# Patient Record
Sex: MALE | Race: WHITE | Employment: FULL TIME | URBAN - METROPOLITAN AREA
[De-identification: names, ages, dates, MRNs, and addresses within clinical notes are randomized per-mention and may not be internally consistent; named-entity substitution may affect disease eponyms.]

---

## 2020-05-21 ENCOUNTER — VIRTUAL VISIT (OUTPATIENT)
Dept: FAMILY MEDICINE | Facility: OTHER | Age: 25
End: 2020-05-21

## 2020-05-22 NOTE — PROGRESS NOTES
"Date: 2020 10:54:24  Clinician: Lorna Del Real  Clinician NPI: [id number]  Patient: Carmelo Coello  Patient : 1995  Patient Address: [address] [place], [place] [id number]  Patient Phone: [phone]  Visit Protocol: URI  Patient Summary:  Carmelo is a 25 year old ( : 1995 ) male who initiated a Visit for COVID-19 (Coronavirus) evaluation and screening. When asked the question \"Please sign me up to receive news, health information and promotions from [employer].\", Carmelo responded \"No\".    Carmelo states his symptoms started 1-2 days ago.   His symptoms consist of facial pain or pressure, chills, a sore throat, a cough, nasal congestion, rhinitis, a headache, malaise, and anosmia. He is experiencing mild difficulty breathing with activities but can speak normally in full sentences. Carmelo also feels feverish but was unable to measure his temperature.   Symptom details     Nasal secretions: The color of his mucus is clear.    Cough: Carmelo coughs a few times an hour and his cough is not more bothersome at night. Phlegm comes into his throat when he coughs. He does not believe his cough is caused by post-nasal drip. The color of the phlegm is clear.     Sore throat: Carmelo reports having mild throat pain (1-3 on a 10 point pain scale), does not have exudate on his tonsils, and can swallow liquids. He is not sure if the lymph nodes in his neck are enlarged. A rash has not appeared on the skin since the sore throat started.     Facial pain or pressure: The facial pain or pressure feels worse when bending over or leaning forward.     Headache: He states the headache is mild (1-3 on a 10 point pain scale).      Carmelo denies having nausea, teeth pain, ageusia, diarrhea, wheezing, vomiting, ear pain, and myalgias. He also denies having recent facial or sinus surgery in the past 60 days and taking antibiotic medication for the symptoms.   Precipitating events  Within the past week, Carmelo has not been exposed to someone " Patient re-evaluated by doctor Elli Luong and patient being prepared for discharge.       Sesar Koch RN  09/04/19 1474 with strep throat. He has not recently been exposed to someone with influenza. Carmelo has been in close contact with the following high risk individuals: adults 65 or older.   Pertinent COVID-19 (Coronavirus) information  In the past 14 days, Carmelo has not worked in a congregate living setting.   He does not work or volunteer as healthcare worker or a  and does not work or volunteer in a healthcare facility.   Carmelo also has not lived in a congregate living setting in the past 14 days. He does not live with a healthcare worker.   Carmelo has not had a close contact with a laboratory-confirmed COVID-19 patient within 14 days of symptom onset.   Pertinent medical history  Carmelo had 1 sinus infection within the past year.   Carmelo needs a return to work/school note.   Weight: 170 lbs   Carmelo smokes or uses smokeless tobacco.   Additional information as reported by the patient (free text): I was in close contact with my significant other when she developed symptoms about two days ago. Her symptoms became much worse last night and fit almost every symptom of Covid-19, including very laboured breathing, fever, chills, cough, pain in ears, sore throat, and a short period of experiencing delusions.     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   Weight: 170 lbs    MEDICATIONS: clonazepam oral, ALLERGIES: NKDA  Clinician Response:  Dear Carmelo,    Your symptoms show that you may have coronavirus (COVID-19). This illness can cause fever, cough and trouble breathing. Many people get a mild case and get better on their own. Some people can get very sick.   Will I be tested for COVID-19?  Because we have limited testing supplies we are not testing everyone if they are low risk. We are testing if:   You are very ill. For example, you're on chemotherapy, dialysis or home hospice care. (Contact your  specialty clinic or program.)   You live in a nursing home or other long-term care facility. (Talk to your nurse manager or medical director.)   You're a health care worker. ([facility] employees Contact our employee health office for testing.)   We are performing limited curbside testing for healthcare/first responders and people with medical problems that put them at increased risk. It does not appear by the [facility] information you submitted that you meet any of these criteria. If there are medical problems that we did not know about, please repeat an [facility] visit and let us know what medical conditions you have.  While you do not qualify for testing via [facility], there are other locations that are offering testing for COVID-19. Please visit [email or web address].gov/covid19/for-[place]/if-sick/testing-locations/index.jsp&nbsp;to find a location near you if you are interested in being tested.  How can I protect others?  Without a test, we can't know for sure that you have COVID-19. For safety, it's very important to follow these rules.  First, stay home and away from others (self-isolate) until:   You've had no fever---and no medicine that reduces fever---for 3 full days (72 hours). And...    Your other symptoms have gotten better. For example, your cough or breathing has improved. And...   At least 10 days have passed since your symptoms started.   During this time:   Don't go to work, school or anywhere else.    Stay away from others in your home. No hugging, kissing or shaking hands.   Don't let anyone visit.   Cover your mouth and nose with a mask, tissue or wash cloth to avoid spreading germs.   Wash your hands and face often. Use soap and water.   How can I take care of myself?  1.Take Tylenol (acetaminophen) for fever or pain. If you have liver or kidney problems, ask your family doctor if it's okay to take Tylenol.   Adults can take either:    650 mg (two 325 mg pills) every 4 to 6 hours, or...   1,000 mg  (two 500 mg pills) every 8 hours as needed.    Note: Don't take more than 3,000 mg in one day.  For children, check the Tylenol bottle for the right dose. The dose is based on the child's age or weight.   2.If you have other health problems (like cancer, heart failure, an organ transplant or severe kidney disease): Call your specialty clinic if you don't feel better in the next 2 days.  3.Know when to call 911: If your breathing is so bad that it keeps you from doing normal activities, call 911 or go to the emergency room. Tell them that you've been staying home and may have COVID-19.  4.Sign up for [employer]. We know it's scary to hear that you might have COVID-19. We want to track your symptoms to make sure you're okay over the next 2 weeks. Please look for an email from [employer]---this is a free, online program that we'll use to keep in touch. To sign up, follow the link in the email. Learn more at [email or web address].  Where can I get more information?  To learn more about COVID-19 and how to care for yourself at home, please visit the CDC website at https://www.cdc.gov/coronavirus/2019-ncov/about/steps-when-sick.html.  For more options for care at [facility], please visit our website at [email or web address]   If you are interested in becoming part of a [facility] clinic trial related to COVID19 please go to [email or web address] for information, if you qualify.         Diagnosis: Cough  Diagnosis ICD: R05  Additional Clinician Notes: Before returning to work, you must be fever free for 3 days and have an improvement in symptoms. Contact your Employee Health Department for your company's return to work guidelines.

## 2021-04-23 ENCOUNTER — HOSPITAL ENCOUNTER (INPATIENT)
Facility: CLINIC | Age: 26
LOS: 6 days | Discharge: HOME | DRG: 896 | End: 2021-04-29
Attending: EMERGENCY MEDICINE | Admitting: STUDENT IN AN ORGANIZED HEALTH CARE EDUCATION/TRAINING PROGRAM
Payer: COMMERCIAL

## 2021-04-23 ENCOUNTER — TELEPHONE (OUTPATIENT)
Dept: BEHAVIORAL HEALTH | Facility: CLINIC | Age: 26
End: 2021-04-23

## 2021-04-23 DIAGNOSIS — F41.1 GENERALIZED ANXIETY DISORDER: ICD-10-CM

## 2021-04-23 DIAGNOSIS — F13.159: ICD-10-CM

## 2021-04-23 DIAGNOSIS — F13.950: ICD-10-CM

## 2021-04-23 DIAGNOSIS — Z11.52 ENCOUNTER FOR SCREENING LABORATORY TESTING FOR SEVERE ACUTE RESPIRATORY SYNDROME CORONAVIRUS 2 (SARS-COV-2): ICD-10-CM

## 2021-04-23 LAB
ALBUMIN SERPL-MCNC: 4.2 G/DL (ref 3.4–5)
ALBUMIN UR-MCNC: NEGATIVE MG/DL
ALP SERPL-CCNC: 59 U/L (ref 40–150)
ALT SERPL W P-5'-P-CCNC: 18 U/L (ref 0–70)
AMPHETAMINES UR QL SCN: NEGATIVE
ANION GAP SERPL CALCULATED.3IONS-SCNC: 8 MMOL/L (ref 3–14)
APPEARANCE UR: CLEAR
AST SERPL W P-5'-P-CCNC: 12 U/L (ref 0–45)
BARBITURATES UR QL: NEGATIVE
BASOPHILS # BLD AUTO: 0 10E9/L (ref 0–0.2)
BASOPHILS NFR BLD AUTO: 0.6 %
BENZODIAZ UR QL: POSITIVE
BILIRUB SERPL-MCNC: 0.5 MG/DL (ref 0.2–1.3)
BILIRUB UR QL STRIP: NEGATIVE
BUN SERPL-MCNC: 17 MG/DL (ref 7–30)
CALCIUM SERPL-MCNC: 9.2 MG/DL (ref 8.5–10.1)
CANNABINOIDS UR QL SCN: NEGATIVE
CHLORIDE SERPL-SCNC: 104 MMOL/L (ref 94–109)
CO2 SERPL-SCNC: 29 MMOL/L (ref 20–32)
COCAINE UR QL: NEGATIVE
COLOR UR AUTO: YELLOW
CREAT SERPL-MCNC: 1.13 MG/DL (ref 0.66–1.25)
DIFFERENTIAL METHOD BLD: NORMAL
EOSINOPHIL # BLD AUTO: 0 10E9/L (ref 0–0.7)
EOSINOPHIL NFR BLD AUTO: 0.3 %
ERYTHROCYTE [DISTWIDTH] IN BLOOD BY AUTOMATED COUNT: 11.6 % (ref 10–15)
ETHANOL UR QL SCN: NEGATIVE
GFR SERPL CREATININE-BSD FRML MDRD: 89 ML/MIN/{1.73_M2}
GLUCOSE SERPL-MCNC: 104 MG/DL (ref 70–99)
GLUCOSE UR STRIP-MCNC: NEGATIVE MG/DL
HCT VFR BLD AUTO: 42.8 % (ref 40–53)
HGB BLD-MCNC: 14.8 G/DL (ref 13.3–17.7)
HGB UR QL STRIP: NEGATIVE
IMM GRANULOCYTES # BLD: 0 10E9/L (ref 0–0.4)
IMM GRANULOCYTES NFR BLD: 0.3 %
INTERPRETATION ECG - MUSE: NORMAL
KETONES UR STRIP-MCNC: NEGATIVE MG/DL
LABORATORY COMMENT REPORT: NORMAL
LEUKOCYTE ESTERASE UR QL STRIP: NEGATIVE
LYMPHOCYTES # BLD AUTO: 1.8 10E9/L (ref 0.8–5.3)
LYMPHOCYTES NFR BLD AUTO: 27.1 %
MCH RBC QN AUTO: 32.2 PG (ref 26.5–33)
MCHC RBC AUTO-ENTMCNC: 34.6 G/DL (ref 31.5–36.5)
MCV RBC AUTO: 93 FL (ref 78–100)
MONOCYTES # BLD AUTO: 0.7 10E9/L (ref 0–1.3)
MONOCYTES NFR BLD AUTO: 10.7 %
NEUTROPHILS # BLD AUTO: 4 10E9/L (ref 1.6–8.3)
NEUTROPHILS NFR BLD AUTO: 61 %
NITRATE UR QL: NEGATIVE
NRBC # BLD AUTO: 0 10*3/UL
NRBC BLD AUTO-RTO: 0 /100
OPIATES UR QL SCN: NEGATIVE
PH UR STRIP: 6 PH (ref 5–7)
PLATELET # BLD AUTO: 239 10E9/L (ref 150–450)
POTASSIUM SERPL-SCNC: 3.9 MMOL/L (ref 3.4–5.3)
PROT SERPL-MCNC: 7.5 G/DL (ref 6.8–8.8)
RBC # BLD AUTO: 4.59 10E12/L (ref 4.4–5.9)
SARS-COV-2 RNA RESP QL NAA+PROBE: NEGATIVE
SODIUM SERPL-SCNC: 141 MMOL/L (ref 133–144)
SOURCE: NORMAL
SP GR UR STRIP: 1.02 (ref 1–1.03)
SPECIMEN SOURCE: NORMAL
UROBILINOGEN UR STRIP-MCNC: NORMAL MG/DL (ref 0–2)
WBC # BLD AUTO: 6.6 10E9/L (ref 4–11)

## 2021-04-23 PROCEDURE — 250N000013 HC RX MED GY IP 250 OP 250 PS 637: Performed by: EMERGENCY MEDICINE

## 2021-04-23 PROCEDURE — 120N000002 HC R&B MED SURG/OB UMMC

## 2021-04-23 PROCEDURE — 99285 EMERGENCY DEPT VISIT HI MDM: CPT | Mod: 25 | Performed by: EMERGENCY MEDICINE

## 2021-04-23 PROCEDURE — C9803 HOPD COVID-19 SPEC COLLECT: HCPCS | Performed by: EMERGENCY MEDICINE

## 2021-04-23 PROCEDURE — 81003 URINALYSIS AUTO W/O SCOPE: CPT | Performed by: EMERGENCY MEDICINE

## 2021-04-23 PROCEDURE — 93010 ELECTROCARDIOGRAM REPORT: CPT | Performed by: EMERGENCY MEDICINE

## 2021-04-23 PROCEDURE — 93005 ELECTROCARDIOGRAM TRACING: CPT | Performed by: EMERGENCY MEDICINE

## 2021-04-23 PROCEDURE — 85025 COMPLETE CBC W/AUTO DIFF WBC: CPT | Performed by: EMERGENCY MEDICINE

## 2021-04-23 PROCEDURE — 99222 1ST HOSP IP/OBS MODERATE 55: CPT | Mod: AI | Performed by: STUDENT IN AN ORGANIZED HEALTH CARE EDUCATION/TRAINING PROGRAM

## 2021-04-23 PROCEDURE — 80053 COMPREHEN METABOLIC PANEL: CPT | Performed by: EMERGENCY MEDICINE

## 2021-04-23 PROCEDURE — 80320 DRUG SCREEN QUANTALCOHOLS: CPT | Performed by: EMERGENCY MEDICINE

## 2021-04-23 PROCEDURE — 90791 PSYCH DIAGNOSTIC EVALUATION: CPT

## 2021-04-23 PROCEDURE — 87635 SARS-COV-2 COVID-19 AMP PRB: CPT | Performed by: EMERGENCY MEDICINE

## 2021-04-23 PROCEDURE — 96360 HYDRATION IV INFUSION INIT: CPT | Performed by: EMERGENCY MEDICINE

## 2021-04-23 PROCEDURE — 80307 DRUG TEST PRSMV CHEM ANLYZR: CPT | Performed by: EMERGENCY MEDICINE

## 2021-04-23 PROCEDURE — 258N000003 HC RX IP 258 OP 636: Performed by: EMERGENCY MEDICINE

## 2021-04-23 PROCEDURE — 96361 HYDRATE IV INFUSION ADD-ON: CPT | Performed by: EMERGENCY MEDICINE

## 2021-04-23 RX ORDER — OLANZAPINE 10 MG/1
10 TABLET, ORALLY DISINTEGRATING ORAL ONCE
Status: COMPLETED | OUTPATIENT
Start: 2021-04-23 | End: 2021-04-23

## 2021-04-23 RX ADMIN — OLANZAPINE 10 MG: 10 TABLET, ORALLY DISINTEGRATING ORAL at 07:56

## 2021-04-23 RX ADMIN — SODIUM CHLORIDE 1000 ML: 9 INJECTION, SOLUTION INTRAVENOUS at 16:06

## 2021-04-23 RX ADMIN — SODIUM CHLORIDE 1000 ML: 9 INJECTION, SOLUTION INTRAVENOUS at 07:56

## 2021-04-23 NOTE — TELEPHONE ENCOUNTER
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 When asked how he got here he said he was playing a game where you get to make choices for the players and their drug use in an alt reality type of setting   Pt responding to internal stimuli, seeing a bike go by, a cupboard open someone come out and then go back in during the assessment   Pt denies sub use, occasional alcohol   Pt reports not sleeping for two nights   Mh hx anx, no current providers   Denies SI, HI, SIB     Medically cleared, eating, drinking, ambulating indep  Patient cleared and ready for behavioral bed placement: Yes   No covid concerns, test ordered and processing     A: 72 HH     R:     537pm - Leeann called and reports the friend is reporting that the pt has been using benzos for 6 months to a year, and within the last week the pt has been trying to taper off. Woke up this morning manic and confused   600pm - Jessee paged   601pm - Jessee reports she needs the drug screen collected, wanst to know if the pt is delirious from withdrawal and needs to go to medicine   605pm - Intake called ED and spoke w MD who reports he will have the RN try and get the UDS collected again and will wait to see what the results are   UDS completed   657pm - Benny paged   659pm - Jessee called, doc to doc initiated @659pm Marc to admit to medical for concerns of delirium. Intake no longer following the case.

## 2021-04-23 NOTE — ED NOTES
Bed: ED08  Expected date: 4/23/21  Expected time: 8:12 AM  Means of arrival:   Comments:  Hold for room 1

## 2021-04-23 NOTE — ED PROVIDER NOTES
ED Provider Note  [facility]      History     Chief Complaint   Patient presents with     Withdrawal     Pt withdrawing from benzo's, unsure which one     HPI  Carmelo Coello is a 25 year old male who is brought in by a friend with concerns that he is withdrawing from benzodiazepines.  Apparently the friend noted that this morning, he was not making sense.  He has been using unknown amount of Phenybut in addition to 2 different types of benzodiazepine analogs (etizolam and flubromazolam)  for an unknown amount of time.  The friend noted that he was shaky and unable to complete sentences this morning and therefore brought him to the emergency room.  Per the friend's report, he has been attempting to wean himself off of these substances.  Past Medical History  Past Medical History:   Diagnosis Date     Anxiety      History reviewed. No pertinent surgical history.  No current outpatient medications on file.    No Known Allergies  Past medical history, past surgical history, medications, and allergies were reviewed with the patient. Additional pertinent items: None    Family History  History reviewed. No pertinent family history.  Family history was reviewed with the patient. Additional pertinent items: None    Social History  Social History     Tobacco Use     Smoking status: Current Every Day Smoker     Types: Vaping Device     Smokeless tobacco: Never Used   Substance Use Topics     Alcohol use: Yes     Drug use: Yes     Frequency: 7.0 times per week     Types: Benzodiazepines      Social history was reviewed with the patient. Additional pertinent items: None      Review of Systems  A complete review of systems was attempted but limited due to altered mental status.    Physical Exam   BP: (!) 140/95  Pulse: 67  Temp: 98.2  F (36.8  C)  Resp: 18  Weight: 77.9 kg (171 lb 11.2 oz)  SpO2: 97 %  Physical Exam  Constitutional:       General: He is not in acute distress.     Appearance: He is not  diaphoretic.   HENT:      Head: Atraumatic.   Eyes:      General: No scleral icterus.     Pupils: Pupils are equal, round, and reactive to light.   Cardiovascular:      Heart sounds: Normal heart sounds.   Pulmonary:      Effort: No respiratory distress.      Breath sounds: Normal breath sounds.   Abdominal:      General: Bowel sounds are normal.      Palpations: Abdomen is soft.      Tenderness: There is no abdominal tenderness.   Musculoskeletal:         General: No tenderness.   Skin:     General: Skin is warm.      Findings: No rash.   Neurological:      General: No focal deficit present.      Mental Status: He is alert.      Cranial Nerves: Cranial nerves are intact.      Motor: Tremor present.   Psychiatric:         Attention and Perception: He is inattentive.         Mood and Affect: Affect is inappropriate.         Speech: Speech is slurred and tangential.         Behavior: Behavior is agitated.         Cognition and Memory: Cognition is impaired. Memory is impaired. He exhibits impaired recent memory and impaired remote memory.         Judgment: Judgment is impulsive and inappropriate.         ED Course      Procedures             EKG Interpretation:      Interpreted by Pepe Haque MD  Time reviewed: 7:06 AM  Symptoms at time of EKG: Altered mental status  Rhythm: normal sinus   Rate: normal  Axis: normal  Ectopy: none  Conduction: normal  ST Segments/ T Waves: No ST-T wave changes  Q Waves: none  Comparison to prior: No old EKG available    Clinical Impression: normal EKG             Results for orders placed or performed during the hospital encounter of 04/23/21   CBC with platelets differential     Status: None   Result Value Ref Range    WBC 6.6 4.0 - 11.0 10e9/L    RBC Count 4.59 4.4 - 5.9 10e12/L    Hemoglobin 14.8 13.3 - 17.7 g/dL    Hematocrit 42.8 40.0 - 53.0 %    MCV 93 78 - 100 fl    MCH 32.2 26.5 - 33.0 pg    MCHC 34.6 31.5 - 36.5 g/dL    RDW 11.6 10.0 - 15.0 %    Platelet Count 239 150 -  450 10e9/L    Diff Method Automated Method     % Neutrophils 61.0 %    % Lymphocytes 27.1 %    % Monocytes 10.7 %    % Eosinophils 0.3 %    % Basophils 0.6 %    % Immature Granulocytes 0.3 %    Nucleated RBCs 0 0 /100    Absolute Neutrophil 4.0 1.6 - 8.3 10e9/L    Absolute Lymphocytes 1.8 0.8 - 5.3 10e9/L    Absolute Monocytes 0.7 0.0 - 1.3 10e9/L    Absolute Eosinophils 0.0 0.0 - 0.7 10e9/L    Absolute Basophils 0.0 0.0 - 0.2 10e9/L    Abs Immature Granulocytes 0.0 0 - 0.4 10e9/L    Absolute Nucleated RBC 0.0    Comprehensive metabolic panel     Status: Abnormal   Result Value Ref Range    Sodium 141 133 - 144 mmol/L    Potassium 3.9 3.4 - 5.3 mmol/L    Chloride 104 94 - 109 mmol/L    Carbon Dioxide 29 20 - 32 mmol/L    Anion Gap 8 3 - 14 mmol/L    Glucose 104 (H) 70 - 99 mg/dL    Urea Nitrogen 17 7 - 30 mg/dL    Creatinine 1.13 0.66 - 1.25 mg/dL    GFR Estimate 89 >60 mL/min/[1.73_m2]    GFR Estimate If Black >90 >60 mL/min/[1.73_m2]    Calcium 9.2 8.5 - 10.1 mg/dL    Bilirubin Total 0.5 0.2 - 1.3 mg/dL    Albumin 4.2 3.4 - 5.0 g/dL    Protein Total 7.5 6.8 - 8.8 g/dL    Alkaline Phosphatase 59 40 - 150 U/L    ALT 18 0 - 70 U/L    AST 12 0 - 45 U/L   EKG 12 lead     Status: None   Result Value Ref Range    Interpretation ECG Click View Image link to view waveform and result      Medications   OLANZapine zydis (zyPREXA) ODT tab 10 mg (10 mg Oral Given 4/23/21 7626)   0.9% sodium chloride BOLUS (0 mLs Intravenous Stopped 4/23/21 0850)   3 PM  Patient awakened and continues to manifest nonlinear thinking.  Patient states that he was addicted to benzodiazepines but has not been using any in the past month.  Continue to await urine toxicology.     Assessments & Plan (with Medical Decision Making)   25-year-old male who presents with altered mental status.  Differential included substance-induced psychosis, primary mental illness such as psychosis or kendra, withdrawal, intoxication, less likely encephalitis or metabolic  disturbance, sepsis.  Examination revealed the patient to be responding to internal stimuli,  having difficulty with directability and intermittent agitation manifest as attempting to pull off monitors and pull out IV.  Patient received Zyprexa and has been sleeping.  The case was discussed with chemical dependency intake and the patient does have a bed reserved on detox but will require clinical history.  He will be reassessed when he is awake but I suspect since he is declining active use of benzodiazepines, he will be discharged.    I have reviewed the nursing notes. I have reviewed the findings, diagnosis, plan and need for follow up with the patient.    New Prescriptions    No medications on file       Final diagnoses:   Altered mental status, unspecified altered mental status type       --  Pepe Haque MD  [facility] EMERGENCY DEPARTMENT  4/23/2021     Pepe Haque MD  04/23/21 1815

## 2021-04-24 PROCEDURE — 120N000002 HC R&B MED SURG/OB UMMC

## 2021-04-24 PROCEDURE — 250N000013 HC RX MED GY IP 250 OP 250 PS 637: Performed by: NURSE PRACTITIONER

## 2021-04-24 PROCEDURE — 258N000003 HC RX IP 258 OP 636: Performed by: INTERNAL MEDICINE

## 2021-04-24 PROCEDURE — 250N000013 HC RX MED GY IP 250 OP 250 PS 637: Performed by: STUDENT IN AN ORGANIZED HEALTH CARE EDUCATION/TRAINING PROGRAM

## 2021-04-24 PROCEDURE — 99255 IP/OBS CONSLTJ NEW/EST HI 80: CPT | Performed by: NURSE PRACTITIONER

## 2021-04-24 RX ORDER — LIDOCAINE 40 MG/G
CREAM TOPICAL
Status: DISCONTINUED | OUTPATIENT
Start: 2021-04-24 | End: 2021-04-29 | Stop reason: HOSPADM

## 2021-04-24 RX ORDER — GABAPENTIN 600 MG/1
600 TABLET ORAL 3 TIMES DAILY
Status: DISCONTINUED | OUTPATIENT
Start: 2021-04-24 | End: 2021-04-26

## 2021-04-24 RX ORDER — ACETAMINOPHEN 325 MG/1
650 TABLET ORAL EVERY 4 HOURS PRN
Status: DISCONTINUED | OUTPATIENT
Start: 2021-04-24 | End: 2021-04-29 | Stop reason: HOSPADM

## 2021-04-24 RX ORDER — ONDANSETRON 2 MG/ML
4 INJECTION INTRAMUSCULAR; INTRAVENOUS EVERY 6 HOURS PRN
Status: DISCONTINUED | OUTPATIENT
Start: 2021-04-24 | End: 2021-04-29 | Stop reason: HOSPADM

## 2021-04-24 RX ORDER — SODIUM CHLORIDE, SODIUM LACTATE, POTASSIUM CHLORIDE, CALCIUM CHLORIDE 600; 310; 30; 20 MG/100ML; MG/100ML; MG/100ML; MG/100ML
INJECTION, SOLUTION INTRAVENOUS CONTINUOUS
Status: DISCONTINUED | OUTPATIENT
Start: 2021-04-24 | End: 2021-04-29

## 2021-04-24 RX ORDER — ONDANSETRON 4 MG/1
4 TABLET, ORALLY DISINTEGRATING ORAL EVERY 6 HOURS PRN
Status: DISCONTINUED | OUTPATIENT
Start: 2021-04-24 | End: 2021-04-29 | Stop reason: HOSPADM

## 2021-04-24 RX ADMIN — SODIUM CHLORIDE, POTASSIUM CHLORIDE, SODIUM LACTATE AND CALCIUM CHLORIDE: 600; 310; 30; 20 INJECTION, SOLUTION INTRAVENOUS at 23:37

## 2021-04-24 RX ADMIN — Medication 1 MG: at 01:37

## 2021-04-24 RX ADMIN — GABAPENTIN 600 MG: 600 TABLET, FILM COATED ORAL at 13:32

## 2021-04-24 RX ADMIN — Medication 1 MG: at 21:50

## 2021-04-24 RX ADMIN — SODIUM CHLORIDE, POTASSIUM CHLORIDE, SODIUM LACTATE AND CALCIUM CHLORIDE: 600; 310; 30; 20 INJECTION, SOLUTION INTRAVENOUS at 15:11

## 2021-04-24 RX ADMIN — ACETAMINOPHEN 650 MG: 325 TABLET, FILM COATED ORAL at 15:10

## 2021-04-24 RX ADMIN — GABAPENTIN 600 MG: 600 TABLET, FILM COATED ORAL at 21:50

## 2021-04-24 RX ADMIN — ACETAMINOPHEN 650 MG: 325 TABLET, FILM COATED ORAL at 19:11

## 2021-04-24 ASSESSMENT — ACTIVITIES OF DAILY LIVING (ADL)
DRESSING/BATHING_DIFFICULTY: NO
DOING_ERRANDS_INDEPENDENTLY_DIFFICULTY: NO
FALL_HISTORY_WITHIN_LAST_SIX_MONTHS: NO
VISION_MANAGEMENT: GLASSES
WALKING_OR_CLIMBING_STAIRS_DIFFICULTY: NO
DIFFICULTY_COMMUNICATING: NO
CONCENTRATING,_REMEMBERING_OR_MAKING_DECISIONS_DIFFICULTY: NO
DIFFICULTY_EATING/SWALLOWING: NO
TOILETING_ISSUES: NO
WEAR_GLASSES_OR_BLIND: YES

## 2021-04-24 NOTE — PLAN OF CARE
"VS: Stable except hypertensive  BP (!) 163/73 (BP Location: Left arm)   Pulse 101   Temp 97.8  F (36.6  C) (Oral)   Resp 18   Wt 77.9 kg (171 lb 11.2 oz)   SpO2 91% '   Cardiac: Tachycardic-HR in mid to upper 90's. Denies chest pain.    O2: >95% on RA. LS clear bilaterally. Denies SOB.   Output: Voiding spontaneously without difficulty. Up to bathroom.   Last BM: 4/24/21   Activity: Independent with steady gait   Up for meals? Yes   Skin: Intact and WNL   Pain: Tylenol given for headache   Neuro/CMS: Disoriented to place and situation. Responded with \"[facility]\" when asked where he was and \"I don't know, I can't remember \"regarding why he is in the hospital.     Intermittent mild to moderate full body tremors. Seem to be induced when you touch patient, almost as if startling him even though he is aware of what you are going to do.    Placed on seizure precautions due to risk during withdrawal.    Dressing: None   Diet: Regular   LDA: Right forearm PIV infusing LR @ 125ml/hr   Equipment: Personal belongings: cell phone, glasses, clothing  Other: sitter at bedside, IV pump/pole, seizure pads   Plan: TBD  72-hour hold until Noon on 4/29/21   Additional Info: Around 1330 while completing assessment, pt's eyes were half open and twitching rapidly. Pt made slight eye movements to show he was aware of writer however he was unable to verbally answer questions. About 20 minutes later, pt got up to go to the bathroom and was able to respond clearly and logically to questions with only mild confusion. Endorsed that he was having \"dream-like\" visual hallucinations but denied auditory hallucinations.     Pt has been following commands and is able to swallow medications.        "

## 2021-04-24 NOTE — PROGRESS NOTES
"Focus: Became very agitated watching t v show [employer]  DB: Patient stated\" this show is causing me distress! I would rather just look out the window\"  I : Patient was able to take shower and able to feed himself dinner.   E: Pt became agitated when his friend Jan called on phone. Did not understand how to turn phone on and off. Too many things to do in a row seem to overwhelm patient. Seems to prefer less noise and slow and simple actions. Status of patient will continue to be monitored  "

## 2021-04-24 NOTE — CONSULTS
Psychiatry consult completed. Patient had difficulty participating in interview. He would close eyes, and have facial twitching and REM, lids closed.  With repeat questions he would awaken and then be a bit startled then go back to eyes closed and twitching. Attendant at bedside did not think he has been ambulating. Answers to questions were minimal and nonsensical. He is not eating, or no answer at all. No rigidity noted. Unknown if he is taking fluids but his mouth appears dry with thick saliva.    I see he has been getting what he has called flubromazolam and other BZD like substances on the internet, and has tried to cut back. Notes from [facility] (12/20) also indicate he uses Kratom. Hx of Klonopin Rx. UDS positive for BZD.  He does use alcohol, unknown amount.  Last MSSA was 7, scoring for tremor. There is also concern for underlying psychosis that may need to be worked up (brain imaging, labs, etc) , but right now he appears to be in complex detox, likely from multiple agents some of which may not show up on UDS. He is at risk of seizure.     BP (!) 149/70 (BP Location: Left arm)   Pulse 98   Temp 97.9  F (36.6  C) (Axillary)   Resp 18   Wt 77.9 kg (171 lb 11.2 oz)   SpO2 95%       Case discussed with Dr Tena and Vandana REA.    Plan:  1. Full note to follow  2. Seizure precautions/seizure pads-ordered  3. Start gabapentin 600 mg TID-ordered  4. Cont 72 hr hold.  -re-eval when detoxed.   5.  Cont 1:1- pt safety and confusion, on hold  6. Continue MSSA. May need to use some BZD to cover detox if scores start ramping up. None ordered now.  7. IV access in place.  May need emergency meds if he develops seizures.  May need IV fluids if he continues with poor PO intake. Risk of dehydration.   8.  If he does not start to clear may need imaging studies and neuro consult.     Please call with any question [phone]      [facility], [place]   Initial Psychiatric Consult   Consult  date: April 24, 2021         Reason for Consult, requesting source:    Withdrawal, psychosis BZD use.  Requesting source: Mago Myers        HPI:   Admitted 4/23/21  Per H&P: Carmelo Coello is a 25 year old male admitted on 4/23/2021. He has history of anxiety and depression, and was brought to the hospital by a friend due to concern for psychosis related to benzodiazepine use and possible withdrawal.     Notes from [facility] indicate Kratom use, as well as buying illicit off market BZD on Internet, flubromazolam and others.  Please see full discussion in DEC assessment. In Dec assessment he was seeing bicycles in his room, and appeared to have internal stimuli. He talked about  Being in a play and made other nonsensical statements.     MSSA 7 today. Scoring for hand tremor and elevated BP.  As noted above, he is only partially responsive to me in my interview today.  He closes his eyes and appears to have REM.  He has facial twitches and hand tremor.  His right arm is in an awkward position up by his head.  No rigidity or cogwheeling.  Dry sticky saliva is seen around his mouth.  Bedside attendant does not think that he has been up to the bathroom and I see a breakfast tray with out any food eaten at bedside.    He remained delusional for 10 hours in the emergency room and there was consideration for admission to psychiatry but due to the question of withdrawal he was admitted to medicine.  Urine drug screen is positive for benzodiazepines.  He has not been on any home psychiatric medications but I see he had a visit at [facility] and they had noted that he had been prescribed Klonopin 1 mg daily as needed for anxiety.  In the BEC he denied suicidal ideation and he denies suicidal ideation today.  He does not respond to full assessment or orientation questions.    EKG QTC on admission 439            Past Psychiatric History:   PSYCHIATRIC HOSPITALIZATIONS: Unknown.  Review of epic does not reveal any psychiatric  hospitalizations  PSYCHIATRIC TREATMENT/DX: Patient has been seen by a provider at [facility] and I see that he has been prescribed Klonopin.  Unknown if he has been followed by psychiatry  CURRENT PSYCHIATRIC PROVIDER: Unknown if currently though he has seen a psychiatric provider in the past  THERAPIST: Unknown  PSYCHOTROPIC HX/RESPONSE/ADR/ADHERENCE: Discussion in the [facility] chart indicates that SSRI was discussed with him and he declined but he has been recently prescribed Klonopin 1 daily as needed for anxiety  ONSET OF PSYCHIATRIC SYMPTOMS: Duration of benzodiazepine use 6 months to 1 year, per records  RECENT STRESSORS: Unknown  HX SUICIDE ATTEMPTS/SIB: Unknown but patient currently denies any suicidal ideation  SLEEP: Unknown  INTEREST/ENERGY: Unable to assess  FOCUS/CONCENTRATION: Currently poor focus and concentration but unable to assess longitudinal history  ST AND LT MEMORY: Currently poor unable to assess  APPETITE: Not eating here at the hospital  FEEDING ISSUES: Withdrawal issues  MOOD HX/DEPRESSION/MADELYN: Unable to fully assess   ANXIETY/PANIC: History of anxiety  OBSESSION/COMPULSIONS: Unknown  TRAUMA-NIGHTMARES/INTRUSIVE THOUGHTS/FLASHBACKS: Unknown   HALLUCINATIONS: Hallucinations noted in BEC assessment  DELUSIONS: Unable to assess at this time  PARANOIA: Unable to assess at this time  OTHER sx THOUGHT DISORDER: Unable to assess at this time     TBI/LOC: No known  DELIRIUM SCREEN: Positive likely secondary to acute drug withdrawal with benzodiazepine or other substances  HISTORY OF COMMITMENT/COURT AUTH MED: None noted in chart          Substance Use and History:   Apparently the patient has been purchasing benzodiazepines off the Internet and has used kratom.  Notes indicate that he has tried to cut back.  Unknown alcohol or other drug use.  Unable to assess at this time.  PEC assessment indicates that he does use alcohol.  Records indicate that he quit smoking tobacco but that he does use e  cigarettes        Past Medical History:   PAST MEDICAL HISTORY:   Past Medical History:   Diagnosis Date     Anxiety        PAST SURGICAL HISTORY: History reviewed. No pertinent surgical history.          Family History:   FAMILY HISTORY:   Family History   Problem Relation Age of Onset     No Known Problems Mother      No Known Problems Father          HX OF SUICIDE IN FAMILY: Unknown   HX OF CD/MI IN FAMILY: Unknown        Social History:   Patient lives with friends and notes indicate that he has a sister was involved in his life.  Notes also indicate that he is worked at [employer].  He is unable to participate in interview to determine social history         Physical ROS:   The patient is unable to engage in physical review of symptoms at this time. The remainder of 10-point review of systems was negative except as noted in HPI.         Medications:     Current Facility-Administered Medications:      acetaminophen (TYLENOL) tablet 650 mg, 650 mg, Oral, Q4H PRN, Mago Myers MD, 650 mg at 04/24/21 1911     gabapentin (NEURONTIN) tablet 600 mg, 600 mg, Oral, TID, Maryana Ramos APRN CNP, 600 mg at 04/24/21 1332     lactated ringers infusion, , Intravenous, Continuous, Shanique Tena MD, Last Rate: 125 mL/hr at 04/24/21 1511, New Bag at 04/24/21 1511     lidocaine (LMX4) cream, , Topical, Q1H PRN, Mago Myers MD     lidocaine 1 % 0.1-1 mL, 0.1-1 mL, Other, Q1H PRN, Mago Myers MD     melatonin tablet 1 mg, 1 mg, Oral, At Bedtime PRN, Mago Myers MD, 1 mg at 04/24/21 0137     ondansetron (ZOFRAN-ODT) ODT tab 4 mg, 4 mg, Oral, Q6H PRN **OR** ondansetron (ZOFRAN) injection 4 mg, 4 mg, Intravenous, Q6H PRN, Mago Myers MD     sodium chloride (PF) 0.9% PF flush 3 mL, 3 mL, Intracatheter, Q8H, Mago Myers MD, 3 mL at 04/24/21 1332     sodium chloride (PF) 0.9% PF flush 3 mL, 3 mL, Intracatheter, q1 min prn, Mago Myers MD  No medications prior to admission.          Allergies:    No Known Allergies       Labs:     Recent Results (from the past 48 hour(s))   EKG 12 lead    Collection Time: 04/23/21  7:04 AM   Result Value Ref Range    Interpretation ECG Click View Image link to view waveform and result    CBC with platelets differential    Collection Time: 04/23/21  7:30 AM   Result Value Ref Range    WBC 6.6 4.0 - 11.0 10e9/L    RBC Count 4.59 4.4 - 5.9 10e12/L    Hemoglobin 14.8 13.3 - 17.7 g/dL    Hematocrit 42.8 40.0 - 53.0 %    MCV 93 78 - 100 fl    MCH 32.2 26.5 - 33.0 pg    MCHC 34.6 31.5 - 36.5 g/dL    RDW 11.6 10.0 - 15.0 %    Platelet Count 239 150 - 450 10e9/L    Diff Method Automated Method     % Neutrophils 61.0 %    % Lymphocytes 27.1 %    % Monocytes 10.7 %    % Eosinophils 0.3 %    % Basophils 0.6 %    % Immature Granulocytes 0.3 %    Nucleated RBCs 0 0 /100    Absolute Neutrophil 4.0 1.6 - 8.3 10e9/L    Absolute Lymphocytes 1.8 0.8 - 5.3 10e9/L    Absolute Monocytes 0.7 0.0 - 1.3 10e9/L    Absolute Eosinophils 0.0 0.0 - 0.7 10e9/L    Absolute Basophils 0.0 0.0 - 0.2 10e9/L    Abs Immature Granulocytes 0.0 0 - 0.4 10e9/L    Absolute Nucleated RBC 0.0    Comprehensive metabolic panel    Collection Time: 04/23/21  7:30 AM   Result Value Ref Range    Sodium 141 133 - 144 mmol/L    Potassium 3.9 3.4 - 5.3 mmol/L    Chloride 104 94 - 109 mmol/L    Carbon Dioxide 29 20 - 32 mmol/L    Anion Gap 8 3 - 14 mmol/L    Glucose 104 (H) 70 - 99 mg/dL    Urea Nitrogen 17 7 - 30 mg/dL    Creatinine 1.13 0.66 - 1.25 mg/dL    GFR Estimate 89 >60 mL/min/[1.73_m2]    GFR Estimate If Black >90 >60 mL/min/[1.73_m2]    Calcium 9.2 8.5 - 10.1 mg/dL    Bilirubin Total 0.5 0.2 - 1.3 mg/dL    Albumin 4.2 3.4 - 5.0 g/dL    Protein Total 7.5 6.8 - 8.8 g/dL    Alkaline Phosphatase 59 40 - 150 U/L    ALT 18 0 - 70 U/L    AST 12 0 - 45 U/L   Asymptomatic SARS-CoV-2 COVID-19 Virus (Coronavirus) by PCR    Collection Time: 04/23/21  5:58 PM    Specimen: Nasopharyngeal   Result Value Ref Range    SARS-CoV-2  Virus Specimen Source Nasopharyngeal     SARS-CoV-2 PCR Result NEGATIVE     SARS-CoV-2 PCR Comment (Note)    UA reflex to Microscopic and Culture    Collection Time: 04/23/21  6:30 PM    Specimen: Urine Midstream; Midstream Urine   Result Value Ref Range    Color Urine Yellow     Appearance Urine Clear     Glucose Urine Negative NEG^Negative mg/dL    Bilirubin Urine Negative NEG^Negative    Ketones Urine Negative NEG^Negative mg/dL    Specific Gravity Urine 1.018 1.003 - 1.035    Blood Urine Negative NEG^Negative    pH Urine 6.0 5.0 - 7.0 pH    Protein Albumin Urine Negative NEG^Negative mg/dL    Urobilinogen mg/dL Normal 0.0 - 2.0 mg/dL    Nitrite Urine Negative NEG^Negative    Leukocyte Esterase Urine Negative NEG^Negative    Source Midstream Urine    Drug abuse screen 6 urine (chem dep)    Collection Time: 04/23/21  6:30 PM   Result Value Ref Range    Amphetamine Qual Urine Negative NEG^Negative    Barbiturates Qual Urine Negative NEG^Negative    Benzodiazepine Qual Urine Positive (A) NEG^Negative    Cannabinoids Qual Urine Negative NEG^Negative    Cocaine Qual Urine Negative NEG^Negative    Ethanol Qual Urine Negative NEG^Negative    Opiates Qualitative Urine Negative NEG^Negative          Physical and Psychiatric Examination:     BP (!) 163/73 (BP Location: Left arm)   Pulse 101   Temp 97.8  F (36.6  C) (Oral)   Resp 18   Wt 77.9 kg (171 lb 11.2 oz)   SpO2 91%   Weight is 171 lbs 11.2 oz  There is no height or weight on file to calculate BMI.    Physical Exam:  I have reviewed the physical exam as documented by by the medical team and agree with findings and assessment and have no additional findings to add at this time.        Mental Status Exam  APPEARANCE/GROOMING/ATTITUDE: Disheveled, resting in bed, unable to cooperate in interview  ORIENTATION: Unable to assess  SPEECH: 1-2 word answers then is unresponsive  MOVEMENTS: Facial twitching, with closed eyes has REM.  Bilateral hand tremor.  No  "cogwheeling or rigidity noted.  He is holding his right arm up in an awkward position by his head  THOUGHT PROCESS: Disorganized  THOUGHT CONTENT: Recent hallucinations and delusions noted.  Patient has denied suicidal ideation.  Unable to fully assess  ATTENTION/CONCENTRATION/RECALL: Poor focus and concentration.  Unable to assess recall  MOOD: Noncontributory  AFFECT: Consistent with delirium   INTELLECTUAL CAPACITY: Unable to assess  FUND OF KNOWLEDGE: Unable to assess  INSIGHT: Poor  JUDGMENT: Poor  IMPULSE CONTROL: Patient is struggled with ongoing substance use despite the consequences    RISK ASSESSMENT: Risk for intentional or unintentional self-harm due to current altered mental status.  Risk the consequences related to ongoing substance use.  Risk of seizures               DSM-5 Diagnosis:   Benzodiazepine use disorder, severe and persistent with benzodiazepine withdrawal symptoms  Psychosis NOS          Assessment:   This is a 25-year-old male who was brought into the emergency room apparently by friends of concern for a psychiatric status; his friends had described him as \"manic and confused\".  Apparently the patient has been buying benzodiazepines on the Internet and records indicate that he is also been using kratom.  He told BEC  that he had been trying to taper off of these medications.  Urine drug screen is positive for benzodiazepines.  The patient is twitching and only partially able to respond to my questions then he closes his eyes and is observed to have facial twitching and rapid eye movement and when I call his name he appears to startle in response.  He has had poor p.o. intake and it appears that he has not been taking adequate fluids.  Given his benzodiazepine use he is at risk of seizures.  He does not appear to be medically stable to be able to transfer to psychiatry.            Summary of Recommendations:   Please see above.             "

## 2021-04-24 NOTE — ED NOTES
Patient was signed out to me at the end of my partner shift.  Patient was thought to be delusional secondary to his benzodiazepine use and that this use was acute one-time in nature.    BEC evaluation revealed the patient has a chronic history of benzodiazepine abuse and recently has been titrating himself down in the dosing.    Clinically the patient is delusional and unsafe to go home.  He has had more than 10 hours to clear in the ER and at this point is still delusional.  How much of this is drug-induced versus underlying psychiatric disorder is unclear.  I attempted to admit the patient to psych however they preferred to have the patient admitted to medicine overnight.      Results for orders placed or performed during the hospital encounter of 04/23/21 (from the past 24 hour(s))   EKG 12 lead   Result Value Ref Range    Interpretation ECG Click View Image link to view waveform and result    CBC with platelets differential   Result Value Ref Range    WBC 6.6 4.0 - 11.0 10e9/L    RBC Count 4.59 4.4 - 5.9 10e12/L    Hemoglobin 14.8 13.3 - 17.7 g/dL    Hematocrit 42.8 40.0 - 53.0 %    MCV 93 78 - 100 fl    MCH 32.2 26.5 - 33.0 pg    MCHC 34.6 31.5 - 36.5 g/dL    RDW 11.6 10.0 - 15.0 %    Platelet Count 239 150 - 450 10e9/L    Diff Method Automated Method     % Neutrophils 61.0 %    % Lymphocytes 27.1 %    % Monocytes 10.7 %    % Eosinophils 0.3 %    % Basophils 0.6 %    % Immature Granulocytes 0.3 %    Nucleated RBCs 0 0 /100    Absolute Neutrophil 4.0 1.6 - 8.3 10e9/L    Absolute Lymphocytes 1.8 0.8 - 5.3 10e9/L    Absolute Monocytes 0.7 0.0 - 1.3 10e9/L    Absolute Eosinophils 0.0 0.0 - 0.7 10e9/L    Absolute Basophils 0.0 0.0 - 0.2 10e9/L    Abs Immature Granulocytes 0.0 0 - 0.4 10e9/L    Absolute Nucleated RBC 0.0    Comprehensive metabolic panel   Result Value Ref Range    Sodium 141 133 - 144 mmol/L    Potassium 3.9 3.4 - 5.3 mmol/L    Chloride 104 94 - 109 mmol/L    Carbon Dioxide 29 20 - 32 mmol/L    Anion  Gap 8 3 - 14 mmol/L    Glucose 104 (H) 70 - 99 mg/dL    Urea Nitrogen 17 7 - 30 mg/dL    Creatinine 1.13 0.66 - 1.25 mg/dL    GFR Estimate 89 >60 mL/min/[1.73_m2]    GFR Estimate If Black >90 >60 mL/min/[1.73_m2]    Calcium 9.2 8.5 - 10.1 mg/dL    Bilirubin Total 0.5 0.2 - 1.3 mg/dL    Albumin 4.2 3.4 - 5.0 g/dL    Protein Total 7.5 6.8 - 8.8 g/dL    Alkaline Phosphatase 59 40 - 150 U/L    ALT 18 0 - 70 U/L    AST 12 0 - 45 U/L   UA reflex to Microscopic and Culture    Specimen: Urine Midstream; Midstream Urine   Result Value Ref Range    Color Urine Yellow     Appearance Urine Clear     Glucose Urine Negative NEG^Negative mg/dL    Bilirubin Urine Negative NEG^Negative    Ketones Urine Negative NEG^Negative mg/dL    Specific Gravity Urine 1.018 1.003 - 1.035    Blood Urine Negative NEG^Negative    pH Urine 6.0 5.0 - 7.0 pH    Protein Albumin Urine Negative NEG^Negative mg/dL    Urobilinogen mg/dL Normal 0.0 - 2.0 mg/dL    Nitrite Urine Negative NEG^Negative    Leukocyte Esterase Urine Negative NEG^Negative    Source Midstream Urine    Drug abuse screen 6 urine (chem dep)   Result Value Ref Range    Amphetamine Qual Urine Negative NEG^Negative    Barbiturates Qual Urine Negative NEG^Negative    Benzodiazepine Qual Urine Positive (A) NEG^Negative    Cannabinoids Qual Urine Negative NEG^Negative    Cocaine Qual Urine Negative NEG^Negative    Ethanol Qual Urine Negative NEG^Negative    Opiates Qualitative Urine Negative NEG^Negative     Medications   OLANZapine zydis (zyPREXA) ODT tab 10 mg (10 mg Oral Given 4/23/21 2526)   0.9% sodium chloride BOLUS (0 mLs Intravenous Stopped 4/23/21 0909)   0.9% sodium chloride BOLUS (0 mLs Intravenous Stopped 4/23/21 5269)       Final diagnoses:   Benzodiazepine-induced psychosis, with delusions (H)     Patient will be placed on a 72-hour hold as he has expressed reluctance to stay.    Bao Tran MD, Bao Garcia MD  04/23/21 8835

## 2021-04-24 NOTE — PROGRESS NOTES
"  Focus: Patients activity  DB: Walked pt to the bathroom. Pt sat down to go to bathroom. Pt was trying to complete hygiene and seemed to struggle to be able to complete the task. Pt was able to follow commands but needed verbal coaching washing hands pulling up pants and getting back into bed.   I: Placed patient back in bed and he finished eating about 1/2 his food. Drank x 2 glasses of fluid Denied nausea.  E: Patient was able to carry on some conversation stating \" I am from the [facility]. I moved here when I was 3 years old.\" Pt also told me that he went to [facility] after growing up in [place], [place]. 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 He also told me \" He remembers taking something last night that made him feel very sleepy. And now he still doesn't feel right.\" When pt falls asleep in bed he twitches in his upper arms, legs and his eyes are almost constantly twitching even though his eyes are closed. Seizure pads are on the side rails. Pt appears to be resting comfortably. Status of patient will continue to be monitored.  "

## 2021-04-24 NOTE — PROGRESS NOTES
Please see note from H and P at 1 am by Dr Myers   Patient discussed in hand off   Continue same plan     Patient seen   Labs and medications reviewed   Spoke with nursing     72 hours hold initiated in ED   Paper work in epic and in patients room   Psychiatry consult requested   ? Transfer to psych

## 2021-04-24 NOTE — H&P
[facility]    History and Physical - Hospitalist Service       Date of Admission:  4/23/2021    Assessment & Plan   Carmelo Coello is a 25 year old male admitted on 4/23/2021. He has history of anxiety and depression, and was brought to the hospital by a friend due to concern for psychosis related to benzodiazepine use and possible withdrawal.     Benzodiazepine use disorder  Acute psychosis due to benzodiazepine use versus withdrawal versus underlying psychiatric condition   Currently only displaying symptoms of acute psychosis (appears to have hallucinations, responding to internal stimuli, delusions). No vital sign instability, diaphoresis, or tremors to support withdrawal, however will continue to monitor closely and treat if symptoms develop. Utox negative for other substances which could contribute to altered mental status. Underlying undiagnosed primary psychiatric disorder is also a possibility, especially if symptoms are persistent despite clearance of benzodiazepines.   - Received Zyprexa 10 mg x 1 in ED. Will hold on further antipsychotics for now while differentiating clinical picture. ECG obtained in ED with normal QTc.   - Start MSSA scoring; will consider benzodiazepine treatment if scores are consistently high   - Psychiatry consult   - 72 hour hold initiated in ED, 1:1 sitter in place      Diet:  Advance as tolerated  DVT Prophylaxis: Mechanical   Haney Catheter: not present  Code Status:  FULL          Disposition Plan   Expected discharge: 2 - 3 days, recommended to prior living arrangement versus inpatient psychiatry.   Entered: Mago Myers MD 04/23/2021, 10:37 PM     The patient's care was discussed with the Patient.    Mago Myers MD  [facility]  Contact information available via [facility] Paging/Directory      ______________________________________________________________________    Chief  "Complaint     History is obtained from the patient, limited by mental status. History obtained from chart review.    History of Present Illness   Carmelo Coello is a 25 year old male with history of anxiety and depression who is brought to the hospital today by a friend due to concerning behavior changes.     Per chart review, his friend stated Carmelo has been taking various benzodiazepines (bought from the internet) for 6 months to 1 year, and had been trying to wean himself off. The patient woke up today confused and altered, prompting his friend to bring him to the emergency room. In the ED he was noted to be responding to internal stimuli, agitated, and difficult to redirect. He was given Zyprexa 10 mg PO and slept for period of time. When he awoke he remained acutely psychotic. He was assessed by Psychiatry who felt he should be placed on a 72 hour hold and admitted for inpatient evaluation and management. He is admitted to Internal Medicine at the recommendation of Psychiatry for medical stabilization due to altered mental status.     Currently Carmelo states he feels \"fine\". When asked why he came to the hospital, he says he only remembers \"vaguely, something to do with the benzodiazepines\". He says he had stopped taking his prescribed benzodiazepines for anxiety 1-2 months ago. However he states there was a \"game show\" that came to his apartment, and offered him \"multiple choices, and I took the benzodiazepines because that seemed like the easiest since I had taken them before\". He says he was instructed to take these in front of his parents which caused him distress. He does not remember how this led to his presentation to the ED. He denies any auditory or visual hallucinations. He denies vision changes, chest pain, palpitations, nausea, vomiting, tremors. He denies any marijuana or IV drug use. He occasionally uses a nicotine vape.     Review of Systems    The 10 point Review of Systems is negative other than " noted in the HPI or here.     Past Medical History    I have reviewed this patient's medical history and updated it with pertinent information if needed.   Past Medical History:   Diagnosis Date     Anxiety        Past Surgical History   I have reviewed this patient's surgical history and updated it with pertinent information if needed.  History reviewed. No pertinent surgical history.    Social History   I have reviewed this patient's social history and updated it with pertinent information if needed.  Social History     Tobacco Use     Smoking status: Current Every Day Smoker     Types: Vaping Device     Smokeless tobacco: Never Used   Substance Use Topics     Alcohol use: Yes     Drug use: Yes     Frequency: 7.0 times per week     Types: Benzodiazepines   Lives in [facility] with 3 roommates. Denies IVDU or marijuana use.     Family History   I have reviewed this patient's family history and updated it with pertinent information if needed.  Family History   Problem Relation Age of Onset     No Known Problems Mother      No Known Problems Father        Prior to Admission Medications   None     Allergies   No Known Allergies    Physical Exam   Vital Signs: Temp: 98.2  F (36.8  C) Temp src: Oral BP: 121/79 Pulse: 65   Resp: 16 SpO2: 97 % O2 Device: None (Room air)    Weight: 171 lbs 11.2 oz    General: Sleeping but wakes to voice. Calm and cooperative. In no distress.  Eyes: Sclera anicteric. No conjunctival injection. PERRLA.   Neck: Supple, full ROM, no adenopathy.  CV: Normal rate and rhythm, normal S1 and S2. No murmurs, rubs, gallops. Radial pulses 2+ and symmetric.  Respiratory: Lungs clear to auscultation bilaterally. No wheezing, rhonchi, or rales.  Abdomen: Soft, non-distended. Non-tender to palpation. No rebound tenderness or guarding. +BS.   MSK: No joint redness, deformity or swelling.   Extremities: No lower extremity edema.   Skin: Warm, dry. No rash on visible skin.   Neuro: Alert, oriented to  "conversation and situation. Face symmetric, speech intact. Moves all extremities.   Psych: Flat affect. Answers questions appropriately, does not appear to be having auditory or visual hallucinations and denies when asked. However does seem to have some paranoia and delusions, for example referencing being on a \"game show\" where he was instructed to take benzodiazepines in front of his parents as part of a \"multiple choice question\".        Data   Data reviewed today: I reviewed all medications, new labs and imaging results over the last 24 hours. I personally reviewed the EKG tracing showing normal sinus rhythm, QTc 439.     Recent Labs   Lab 04/23/21  0730   WBC 6.6   HGB 14.8   MCV 93         POTASSIUM 3.9   CHLORIDE 104   CO2 29   BUN 17   CR 1.13   ANIONGAP 8   JOSE 9.2   *   ALBUMIN 4.2   PROTTOTAL 7.5   BILITOTAL 0.5   ALKPHOS 59   ALT 18   AST 12     No results found for this or any previous visit (from the past 24 hour(s)).    "

## 2021-04-24 NOTE — PLAN OF CARE
Pt alert, disoriented to situation. Calm, and cooperative. Slow to respond to questions. VSS. Up independently. Appears to be sleeping most of night. Pt denies hallucinations, was noted to be talking to self. MSSA 7. Denies pain. IV on R FA. On 72 hour hold, 1:1 sitter at bedside. Continue w/ plan of care.

## 2021-04-24 NOTE — ED NOTES
[facility]   ED Nurse to Floor Handoff     Carmelo Coello is a 25 year old male who speaks English and lives with others,  in a home  They arrived in the ED by car from home    ED Chief Complaint: Withdrawal (Pt withdrawing from benzo's, unsure which one)    ED Dx;   Final diagnoses:   Benzodiazepine-induced psychosis, with delusions (H)         Needed?: No    Allergies: No Known Allergies.  Past Medical Hx:   Past Medical History:   Diagnosis Date     Anxiety       Baseline Mental status: WDL  Current Mental Status changes: at basesline    Infection present or suspected this encounter: no  Sepsis suspected: No  Isolation type: No active isolations  Patient tested for COVID 19 prior to admission: NO     Activity level - Baseline/Home:  Independent  Activity Level - Current:   Independent    Bariatric equipment needed?: No    In the ED these meds were given:   Medications   OLANZapine zydis (zyPREXA) ODT tab 10 mg (10 mg Oral Given 4/23/21 3626)   0.9% sodium chloride BOLUS (0 mLs Intravenous Stopped 4/23/21 0850)   0.9% sodium chloride BOLUS (0 mLs Intravenous Stopped 4/23/21 4218)       Drips running?  No    Home pump  No    Current LDAs  Peripheral IV 04/23/21 Right Upper arm (Active)   Site Assessment WDL 04/23/21 0730   Line Status Saline locked 04/23/21 0730   Dressing Intervention New dressing  04/23/21 0730   Phlebitis Scale 0-->no symptoms 04/23/21 0730   Number of days: 0       Labs results:   Labs Ordered and Resulted from Time of ED Arrival Up to the Time of Departure from the ED   COMPREHENSIVE METABOLIC PANEL - Abnormal; Notable for the following components:       Result Value    Glucose 104 (*)     All other components within normal limits   DRUG ABUSE SCREEN 6 CHEM DEP URINE ([facility]) - Abnormal; Notable for the following components:    Benzodiazepine Qual Urine Positive (*)     All other components within normal limits   CBC WITH PLATELETS  DIFFERENTIAL   UA MACROSCOPIC WITH REFLEX TO MICRO AND CULTURE   SARS-COV-2 (COVID-19) VIRUS RT-PCR   DOCUMENT IN LEGAL HOLD NAVIGATOR       Imaging Studies: No results found for this or any previous visit (from the past 24 hour(s)).    Recent vital signs:   /79   Pulse 65   Temp 98.2  F (36.8  C) (Oral)   Resp 16   Wt 77.9 kg (171 lb 11.2 oz)   SpO2 97%     Bonita Coma Scale Score: 14 (04/23/21 0658)       Cardiac Rhythm: Normal Sinus  Pt needs tele? No  Skin/wound Issues: None    Code Status: Full Code    Pain control: pt had none    Nausea control: pt had none    Abnormal labs/tests/findings requiring intervention:     Family present during ED course? No   Family Comments/Social Situation comments:     Tasks needing completion: None    Maryana Cardozo RN  [facility] --   [phone] [facility]  [phone] [facility]

## 2021-04-24 NOTE — PROGRESS NOTES
Psychiatry consult completed. Patient had difficulty participating in interview. He would close eyes, and have facial twitching and REM, lids closed.  With repeat questions he would awaken and then be a bit startled then go back to eyes closed and twitching. Attendant at bedside did not think he has been ambulating. Answers to questions were minimal and nonsensical. He is not eating, or no answer at all. No rigidity noted. Unknown if he is taking fluids but his mouth appears dry with thick saliva.     I see he has been getting what he has called flubromazolam and other BZD like substances on the internet, and has tried to cut back. Notes from Darnell (12/20) also indicate he uses Kratom. Hx of Klonopin Rx. UDS positive for BZD. Last MSSA was 7, scoring for tremor. There is also concern for underlying psychosis that may need to be worked up (brain imaging, labs, etc) , but right now he appears to be in complex detox, likely from multiple agents some of which may not show up on UDS. He is at risk of seizure.      BP (!) 149/70 (BP Location: Left arm)   Pulse 98   Temp 97.9  F (36.6  C) (Axillary)   Resp 18   Wt 77.9 kg (171 lb 11.2 oz)   SpO2 95%         Case discussed with Dr Tena and Vandana REA.     Plan:  1. Full note to follow  2. Seizure precautions/seizure pads-ordered  3. Start gabapentin 600 mg TID-ordered  4. Cont 72 hr hold.  -re-eval when detoxed.   5.  Cont 1:1- pt safety and confusion, on hold  6. Continue MSSA. May need to use some BZD to cover detox if scores start ramping up. None ordered now.  7. IV access in place. May need IV fluids if he continues with poor PO intake. Risk of dehydration.   8.  If he does not start to clear may need imaging studies and neuro consult.      Please call with any question [phone]

## 2021-04-25 LAB
ALBUMIN SERPL-MCNC: 3.9 G/DL (ref 3.4–5)
ALP SERPL-CCNC: 54 U/L (ref 40–150)
ALT SERPL W P-5'-P-CCNC: 15 U/L (ref 0–70)
ANION GAP SERPL CALCULATED.3IONS-SCNC: 7 MMOL/L (ref 3–14)
AST SERPL W P-5'-P-CCNC: 8 U/L (ref 0–45)
BILIRUB SERPL-MCNC: 0.5 MG/DL (ref 0.2–1.3)
BUN SERPL-MCNC: 13 MG/DL (ref 7–30)
CALCIUM SERPL-MCNC: 8.6 MG/DL (ref 8.5–10.1)
CHLORIDE SERPL-SCNC: 108 MMOL/L (ref 94–109)
CO2 SERPL-SCNC: 27 MMOL/L (ref 20–32)
CREAT SERPL-MCNC: 0.96 MG/DL (ref 0.66–1.25)
ERYTHROCYTE [DISTWIDTH] IN BLOOD BY AUTOMATED COUNT: 11.3 % (ref 10–15)
GFR SERPL CREATININE-BSD FRML MDRD: >90 ML/MIN/{1.73_M2}
GLUCOSE SERPL-MCNC: 106 MG/DL (ref 70–99)
HCT VFR BLD AUTO: 43.2 % (ref 40–53)
HGB BLD-MCNC: 14.8 G/DL (ref 13.3–17.7)
MCH RBC QN AUTO: 32.4 PG (ref 26.5–33)
MCHC RBC AUTO-ENTMCNC: 34.3 G/DL (ref 31.5–36.5)
MCV RBC AUTO: 95 FL (ref 78–100)
PLATELET # BLD AUTO: 235 10E9/L (ref 150–450)
POTASSIUM SERPL-SCNC: 3.6 MMOL/L (ref 3.4–5.3)
PROT SERPL-MCNC: 7 G/DL (ref 6.8–8.8)
RBC # BLD AUTO: 4.57 10E12/L (ref 4.4–5.9)
SODIUM SERPL-SCNC: 142 MMOL/L (ref 133–144)
WBC # BLD AUTO: 7.3 10E9/L (ref 4–11)

## 2021-04-25 PROCEDURE — 250N000013 HC RX MED GY IP 250 OP 250 PS 637: Performed by: NURSE PRACTITIONER

## 2021-04-25 PROCEDURE — 85027 COMPLETE CBC AUTOMATED: CPT | Performed by: INTERNAL MEDICINE

## 2021-04-25 PROCEDURE — 250N000013 HC RX MED GY IP 250 OP 250 PS 637: Performed by: INTERNAL MEDICINE

## 2021-04-25 PROCEDURE — 80053 COMPREHEN METABOLIC PANEL: CPT | Performed by: INTERNAL MEDICINE

## 2021-04-25 PROCEDURE — 99233 SBSQ HOSP IP/OBS HIGH 50: CPT | Performed by: NURSE PRACTITIONER

## 2021-04-25 PROCEDURE — 36415 COLL VENOUS BLD VENIPUNCTURE: CPT | Performed by: INTERNAL MEDICINE

## 2021-04-25 PROCEDURE — 99232 SBSQ HOSP IP/OBS MODERATE 35: CPT | Performed by: INTERNAL MEDICINE

## 2021-04-25 PROCEDURE — 120N000002 HC R&B MED SURG/OB UMMC

## 2021-04-25 RX ORDER — OLANZAPINE 5 MG/1
5 TABLET, ORALLY DISINTEGRATING ORAL ONCE
Status: COMPLETED | OUTPATIENT
Start: 2021-04-25 | End: 2021-04-25

## 2021-04-25 RX ORDER — LORAZEPAM 1 MG/1
1 TABLET ORAL 3 TIMES DAILY
Status: DISCONTINUED | OUTPATIENT
Start: 2021-04-25 | End: 2021-04-26

## 2021-04-25 RX ORDER — OLANZAPINE 5 MG/1
5 TABLET, ORALLY DISINTEGRATING ORAL 3 TIMES DAILY PRN
Status: DISCONTINUED | OUTPATIENT
Start: 2021-04-25 | End: 2021-04-29 | Stop reason: HOSPADM

## 2021-04-25 RX ADMIN — LORAZEPAM 1 MG: 1 TABLET ORAL at 22:24

## 2021-04-25 RX ADMIN — LORAZEPAM 1 MG: 1 TABLET ORAL at 16:20

## 2021-04-25 RX ADMIN — GABAPENTIN 600 MG: 600 TABLET, FILM COATED ORAL at 22:24

## 2021-04-25 RX ADMIN — GABAPENTIN 600 MG: 600 TABLET, FILM COATED ORAL at 08:22

## 2021-04-25 RX ADMIN — GABAPENTIN 600 MG: 600 TABLET, FILM COATED ORAL at 16:20

## 2021-04-25 RX ADMIN — OLANZAPINE 5 MG: 5 TABLET, ORALLY DISINTEGRATING ORAL at 08:45

## 2021-04-25 RX ADMIN — LORAZEPAM 1 MG: 1 TABLET ORAL at 10:21

## 2021-04-25 NOTE — PROGRESS NOTES
[facility]    Medicine Progress Note - Hospitalist Service       Date of Admission:  4/23/2021  Assessment & Plan       Carmelo Coello is a 25 year old male admitted on 4/23/2021. He has history of anxiety and depression, and was brought to the hospital by a friend due to concern for psychosis related to benzodiazepine use and possible withdrawal.      Benzodiazepine use disorder  Acute psychosis due to benzodiazepine use versus withdrawal versus underlying psychiatric condition   Acute encephalopathy likely due to medications   neede one time po zyprexa in am   Psych planning to start ativan today to help with withdrawl   - Psychiatry consult   - 72 hour hold initiated in ED, 1:1 sitter in place     LGF ;  No sign of infection ( UA negative , wbc normal )  Probably due to withdrawal   moniotr          Diet: Regular Diet Adult    DVT Prophylaxis: mechanical . ambulating   Haney Catheter: not present  Code Status: Full Code                The patient's care was discussed with the Bedside Nurse, Patient and psychiatry .    Shanique Tena MD  Hospitalist Service  [facility]  Contact information available via [facility] Paging/Directory    ______________________________________________________________________    Interval History   Confused   Awake   Tried to leave earlier   No chest pain   No sob   No twitching     Data reviewed today: I reviewed all medications, new labs and imaging results over the last 24 hours    Physical Exam   Vital Signs: Temp: 100.1  F (37.8  C) Temp src: Axillary BP: (!) 148/73 Pulse: 103   Resp: 18 SpO2: 95 % O2 Device: None (Room air)    Weight: 171 lbs 11.2 oz  Constitutional: awake, alert, cooperative, no apparent distress, and appears stated age  Confused about whu he is in hospital . No clonus or rigidity   Eyes: Lids and lashes normal, pupils equal, round and reactive to light, extra ocular muscles  intact, sclera clear, conjunctiva normal  Hematologic / Lymphatic: no cervical lymphadenopathy  Respiratory: No increased work of breathing, good air exchange, clear to auscultation bilaterally, no crackles or wheezing  Cardiovascular: Normal apical impulse, regular rate and rhythm, normal S1 and S2, no S3 or S4, and no murmur noted  GI: No scars, normal bowel sounds, soft, non-distended, non-tender, no masses palpated, no hepatosplenomegally    Data   Recent Labs   Lab 04/25/21  0542 04/23/21  0730   WBC 7.3 6.6   HGB 14.8 14.8   MCV 95 93    239    141   POTASSIUM 3.6 3.9   CHLORIDE 108 104   CO2 27 29   BUN 13 17   CR 0.96 1.13   ANIONGAP 7 8   JOSE 8.6 9.2   * 104*   ALBUMIN 3.9 4.2   PROTTOTAL 7.0 7.5   BILITOTAL 0.5 0.5   ALKPHOS 54 59   ALT 15 18   AST 8 12

## 2021-04-25 NOTE — PLAN OF CARE
"VS: VSS  /61 (BP Location: Left arm)   Pulse 93   Temp 100.1  F (37.8  C) (Axillary)   Resp 16   Wt 77.9 kg (171 lb 11.2 oz)   SpO2 96%    Cardiac: Tachycardic-HR in mid to upper 90's. Denies chest pain.    O2: >95% on RA. LS clear bilaterally. Denies SOB.   Output: Voiding spontaneously without difficulty. Up to bathroom.   Last BM: 4/24/21   Activity: Independent with steady gait   Up for meals? Yes   Skin: Intact and WNL   Pain: Denies. Prn Zyprexa available for agitation/restlessness.    Neuro/CMS: Disoriented to situation.    Intermittent full body jerking.  Denies numbness and tingling.   Seizure precautions for withdrawal   Behavior/Speech: Cooperative with cares, needs a lot of encouragement/prompting to do activities such as going to the bathroom.     Answers most questions appropriately however occasionally appears to be talking to himself/responding to internal stimuli. I asked patient if he was hearing things and he stated yes \"I'm listening to the instructions\" and then closed his eyes.    Pt follows commands and is able to swallow medications.    Diet: Regular   LDA: PIV removed per pt request   Equipment: Personal belongings: cell phone remains with patient. Clothing and shoes placed in secure locker outside room.   Other: sitter at bedside, seizure pads   Plan: TBD  72-hour hold until Noon on 4/29/21   Additional Info: Around 0800 this morning, writer was alerted by bedside sitter that pt had left his room and was trying to leave the unit. Pt was in the stairwell when I reached him and he was redirected to his room. Pt appeared restless. MD pagemaria luisa and PRN zyprexa given. Belongings were place in secure locker to prevent them from distracting pt to leave again.     While assessing patient, he tried to leave his room stating \"I was following instructions.\" I reminded him that he was in the hospital and he went back to his bed. No other attempts to leave room were reported by bedside sitter.  "

## 2021-04-25 NOTE — PHARMACY-ADMISSION MEDICATION HISTORY
Admission Medication History Completed by Pharmacy    See [facility] Admission Navigator for allergy information, preferred outpatient pharmacy, prior to admission medications and immunization status.     Medication History Sources:     Pharmacy fill history via [facility] Everywhere        Changes made to PTA medication list (reason):    Added: None    Deleted: None    Changed: None    Additional Information:    Attempted to contact patient for medication history but he was unable to participate in interview. It appears he is not on any prescription medications. No medications seen in fill history. Some supplements seen in Care Everywhere from 12/2020 but did not add to list. No medications listed in [place] . If patient's mental status clears pharmacy can attempt to interview patient again for updated medication history.    Prior to Admission medications    Not on File       Date completed: 04/25/21    Medication history completed by: Damari Choi RP

## 2021-04-25 NOTE — CONSULTS
Psychiatry Consultation; Follow up              Reason for Consult, requesting source:    Follow up  Requesting source: Mago Myers                 Interim history:    Carmelo Coello is a 25 year old male admitted on 4/23/2021. He has history of anxiety and depression, and was brought to the hospital by a friend due to concern for psychosis related to benzodiazepine use and possible withdrawal.    Charting over the last 24 hour were reviewed, he is more alert and able to engage in the interview today. Based on MSSA scores and out of caution for potential for seizure with withdrawal, as well as some agitation this AM Ativan 1 mg TID was started. Gabapentin was started yesterday.  The patient tells me that he is feeling much better today and has been able to get up to BR and he has been eating.  He has some IV fluids, now stopped and he is taking PO fluids.    When I talked to him today about his benzodiazepine use he continues to be quite vague.  However he does say that he was obtaining several benzodiazepine related substances on the Internet and had tried to cut back.  He acknowledges craton use but says that that has been over a year ago.  He had been prescribed Klonopin but that was also over a year ago.  MSSA scores have been higher scoring as high as 24, most recently 10.  He has scored for tremor, eating disturbance, sleep disturbance, hallucinations and alterations in his vital signs.  Blood pressure has been up to 159/90.  Pulse 103.  He has a bit of a temperature at 100.1 that we are monitoring closely.  Covid testing on admission was negative.  Patient is taking p.o. medications without difficulty.  As noted yesterday his QTC on EKG was 439.    He denies suicidal ideation and tells me that he will consider chemical dependency treatment.  When I talk to him about auditory or visual hallucination he denies this but he gives a rather strange response to his current status later in the interview,  telling me that he was hooked up to a meter that measured the gas in his stomach and that he saw the meter and it was high which suggests some psychotic symptoms.    Patient tells me today that he graduated from [employer] with a degree in psychology.  His family and friends are supportive.  His mother and younger sister live in [facility].  He moved to the [facility] from [facility] as a young child.  His parents subsequently  and his father returned to [facility].  No known MI or CAD in his biological relatives.  He tells me that he is generally in good health.     Lab Results   Component Value Date    WBC 7.3 04/25/2021     Lab Results   Component Value Date    RBC 4.57 04/25/2021     Lab Results   Component Value Date    HGB 14.8 04/25/2021     Lab Results   Component Value Date    HCT 43.2 04/25/2021     No components found for: MCT  Lab Results   Component Value Date    MCV 95 04/25/2021     Lab Results   Component Value Date    MCH 32.4 04/25/2021     Lab Results   Component Value Date    MCHC 34.3 04/25/2021     Lab Results   Component Value Date    RDW 11.3 04/25/2021     Lab Results   Component Value Date     04/25/2021     Last Comprehensive Metabolic Panel:  Sodium   Date Value Ref Range Status   04/25/2021 142 133 - 144 mmol/L Final     Potassium   Date Value Ref Range Status   04/25/2021 3.6 3.4 - 5.3 mmol/L Final     Chloride   Date Value Ref Range Status   04/25/2021 108 94 - 109 mmol/L Final     Carbon Dioxide   Date Value Ref Range Status   04/25/2021 27 20 - 32 mmol/L Final     Anion Gap   Date Value Ref Range Status   04/25/2021 7 3 - 14 mmol/L Final     Glucose   Date Value Ref Range Status   04/25/2021 106 (H) 70 - 99 mg/dL Final     Urea Nitrogen   Date Value Ref Range Status   04/25/2021 13 7 - 30 mg/dL Final     Creatinine   Date Value Ref Range Status   04/25/2021 0.96 0.66 - 1.25 mg/dL Final     GFR Estimate   Date Value Ref Range Status   04/25/2021 >90  >60 mL/min/[1.73_m2] Final     Comment:     Non  GFR Calc  Starting 12/18/2018, serum creatinine based estimated GFR (eGFR) will be   calculated using the Chronic Kidney Disease Epidemiology Collaboration   (CKD-EPI) equation.       Calcium   Date Value Ref Range Status   04/25/2021 8.6 8.5 - 10.1 mg/dL Final     Bilirubin Total   Date Value Ref Range Status   04/25/2021 0.5 0.2 - 1.3 mg/dL Final     Alkaline Phosphatase   Date Value Ref Range Status   04/25/2021 54 40 - 150 U/L Final     ALT   Date Value Ref Range Status   04/25/2021 15 0 - 70 U/L Final     AST   Date Value Ref Range Status   04/25/2021 8 0 - 45 U/L Final            Medications:     Current Facility-Administered Medications:      acetaminophen (TYLENOL) tablet 650 mg, 650 mg, Oral, Q4H PRN, Mago Myers MD, 650 mg at 04/24/21 1911     gabapentin (NEURONTIN) tablet 600 mg, 600 mg, Oral, TID, Maryana Ramos APRN CNP, 600 mg at 04/25/21 0822     lactated ringers infusion, , Intravenous, Continuous, Shanique Tena MD, Last Rate: 125 mL/hr at 04/24/21 2337, New Bag at 04/24/21 2337     lidocaine (LMX4) cream, , Topical, Q1H PRN, Mago Myers MD     lidocaine 1 % 0.1-1 mL, 0.1-1 mL, Other, Q1H PRN, Mago Myers MD     LORazepam (ATIVAN) tablet 1 mg, 1 mg, Oral, TID, Maryana Rmaos APRN CNP, 1 mg at 04/25/21 1021     melatonin tablet 1 mg, 1 mg, Oral, At Bedtime PRN, Mago Myers MD, 1 mg at 04/24/21 2150     OLANZapine zydis (zyPREXA) ODT tab 5 mg, 5 mg, Oral, TID PRN, Maryana Ramos APRN CNP     ondansetron (ZOFRAN-ODT) ODT tab 4 mg, 4 mg, Oral, Q6H PRN **OR** ondansetron (ZOFRAN) injection 4 mg, 4 mg, Intravenous, Q6H PRN, Mago Myers MD     sodium chloride (PF) 0.9% PF flush 3 mL, 3 mL, Intracatheter, Q8H, Mago Myers MD, 3 mL at 04/24/21 1332     sodium chloride (PF) 0.9% PF flush 3 mL, 3 mL, Intracatheter, q1 min prn, Mago Myers MD           MSE:   APPEARANCE/GROOMING/ATTITUDE: Fair grooming  "though somewhat malodorous.  Calm, cooperative though he had been agitated in the morning  ORIENTATION: Oriented to person.  Knows he is in the hospital but is unable to tell me the name of the hospital.  Oriented to date day and situation.  SPEECH: Normal rate and rhythm, clear  MOVEMENTS: Occasional twitching of face and arms and mild body jerking of upper extremities noted over the 20-minute interview but much improved since yesterday.  Only mild facial twitching.  Moderate bilateral hand tremor.  Denies restlessness  THOUGHT PROCESS: Moderate level of disorganization with non sequiturs  THOUGHT CONTENT: Denies thoughts of self-harm or thoughts of harm towards others.  Denies auditory or visual hallucinations though gives an account of a meter being hooked up to his stomach which indicates psychosis/withdrawal delirium.  No clear evidence of paranoia  ATTENTION/CONCENTRATION/RECALL: Fair attention and concentration, recall 3/3  Cognition: Able to name the days of the week forwards and backwards.  Able to name current and past 2 presidents correctly.  Able to calculate the number of quarters in a dollar 75  MOOD: \"Not that great\"  AFFECT: Flat, preoccupied  INTELLECTUAL CAPACITY: Average  FUND OF KNOWLEDGE: Generally intact  INSIGHT: Fair  JUDGMENT: Fair  IMPULSE CONTROL: Compromised as evidenced by ongoing substance use and agitation earlier today    RISK ASSESSMENT: Risk of seizure.  Risk of relapse to substance use with consequences on his physical and mental health        Vital signs:  Temp: 100.1  F (37.8  C) Temp src: Axillary BP: (!) 148/73 Pulse: 103   Resp: 18 SpO2: 95 % O2 Device: None (Room air)     Weight: 77.9 kg (171 lb 11.2 oz)  There is no height or weight on file to calculate BMI.              DSM-5 Diagnosis:   Benzodiazepine use disorder, severe and persistent with benzodiazepine withdrawal symptoms  Psychosis NOS          Assessment:   This is a 25-year-old single male who was seen in initial " psychiatry consult yesterday for benzodiazepine use disorder and benzodiazepine withdrawal symptoms.  He had some agitation this morning and was given Zyprexa 5 mg.  He was started on Ativan 1 mg p.o. 3 times daily to address withdrawal symptoms and minimize seizure risk along with gabapentin.  Today the patient tells me he is feeling more comfortable and he is willing to have a chemical dependency assessment.  He is a bit ambivalent when I talk about actual treatment.  He has some unusual ideas about his stomach being hooked up to some kind of meter to measure gas and he tells me that he saw that the meter gave a high reading.  He is vague when I ask follow-up questions about this.  I talked to him about his agitation this morning and he tells me that he will try to remain calm and that he is feeling much better at this time.     Patient requires ongoing medical care at this time to manage benzodiazepine withdrawal.            Summary of Recommendations:   1.  Continue Ativan 1 mg p.o. 3 times daily for benzodiazepine withdrawal.-ordered  2.  Continue gabapentin 600 mg p.o. 3 times daily-ordered yesterday  3.  Zyprexa Zydis 5 mg p.o. 3 times daily as needed agitation-ordered  4.  I have entered an order for psych consult follow-up for tomorrow to consider benzodiazepine dosing going forward to assess if patient is medically stable and ready to transfer to psychiatry  5.  Continue 72-hour hold and one-to-one staffing for now.  Patient had a period of agitation today and we want to provide for his safety.  This can be reassessed tomorrow  6.  Chemical dependency assessment tomorrow    I discussed the case with Dr. Tena today  Please call if you have any questions [phone]

## 2021-04-26 LAB
ALBUMIN SERPL-MCNC: 4.3 G/DL (ref 3.4–5)
ALP SERPL-CCNC: 59 U/L (ref 40–150)
ALT SERPL W P-5'-P-CCNC: 17 U/L (ref 0–70)
ANION GAP SERPL CALCULATED.3IONS-SCNC: 6 MMOL/L (ref 3–14)
AST SERPL W P-5'-P-CCNC: 9 U/L (ref 0–45)
BILIRUB SERPL-MCNC: 0.6 MG/DL (ref 0.2–1.3)
BUN SERPL-MCNC: 14 MG/DL (ref 7–30)
CALCIUM SERPL-MCNC: 9.1 MG/DL (ref 8.5–10.1)
CHLORIDE SERPL-SCNC: 107 MMOL/L (ref 94–109)
CO2 SERPL-SCNC: 29 MMOL/L (ref 20–32)
CREAT SERPL-MCNC: 1.08 MG/DL (ref 0.66–1.25)
ERYTHROCYTE [DISTWIDTH] IN BLOOD BY AUTOMATED COUNT: 11.7 % (ref 10–15)
GFR SERPL CREATININE-BSD FRML MDRD: >90 ML/MIN/{1.73_M2}
GLUCOSE SERPL-MCNC: 96 MG/DL (ref 70–99)
HCT VFR BLD AUTO: 44.2 % (ref 40–53)
HGB BLD-MCNC: 15 G/DL (ref 13.3–17.7)
MCH RBC QN AUTO: 32.3 PG (ref 26.5–33)
MCHC RBC AUTO-ENTMCNC: 33.9 G/DL (ref 31.5–36.5)
MCV RBC AUTO: 95 FL (ref 78–100)
PLATELET # BLD AUTO: 238 10E9/L (ref 150–450)
POTASSIUM SERPL-SCNC: 3.9 MMOL/L (ref 3.4–5.3)
PROT SERPL-MCNC: 7.7 G/DL (ref 6.8–8.8)
RBC # BLD AUTO: 4.65 10E12/L (ref 4.4–5.9)
SODIUM SERPL-SCNC: 142 MMOL/L (ref 133–144)
WBC # BLD AUTO: 9.2 10E9/L (ref 4–11)

## 2021-04-26 PROCEDURE — HZ2ZZZZ DETOXIFICATION SERVICES FOR SUBSTANCE ABUSE TREATMENT: ICD-10-PCS | Performed by: HOSPITALIST

## 2021-04-26 PROCEDURE — 36415 COLL VENOUS BLD VENIPUNCTURE: CPT | Performed by: INTERNAL MEDICINE

## 2021-04-26 PROCEDURE — 250N000013 HC RX MED GY IP 250 OP 250 PS 637: Performed by: NURSE PRACTITIONER

## 2021-04-26 PROCEDURE — 120N000002 HC R&B MED SURG/OB UMMC

## 2021-04-26 PROCEDURE — 999N000216 HC STATISTIC ADULT CD FACE TO FACE-NO CHRG

## 2021-04-26 PROCEDURE — 80053 COMPREHEN METABOLIC PANEL: CPT | Performed by: INTERNAL MEDICINE

## 2021-04-26 PROCEDURE — 85027 COMPLETE CBC AUTOMATED: CPT | Performed by: INTERNAL MEDICINE

## 2021-04-26 PROCEDURE — 99232 SBSQ HOSP IP/OBS MODERATE 35: CPT | Performed by: INTERNAL MEDICINE

## 2021-04-26 PROCEDURE — 99232 SBSQ HOSP IP/OBS MODERATE 35: CPT | Performed by: NURSE PRACTITIONER

## 2021-04-26 RX ORDER — PHENOBARBITAL 64.8 MG/1
64.8 TABLET ORAL 3 TIMES DAILY
Status: DISCONTINUED | OUTPATIENT
Start: 2021-04-26 | End: 2021-04-26

## 2021-04-26 RX ORDER — PHENOBARBITAL 32.4 MG/1
32.4 TABLET ORAL 2 TIMES DAILY
Status: DISCONTINUED | OUTPATIENT
Start: 2021-04-29 | End: 2021-04-28

## 2021-04-26 RX ORDER — PHENOBARBITAL 64.8 MG/1
64.8 TABLET ORAL 3 TIMES DAILY
Status: COMPLETED | OUTPATIENT
Start: 2021-04-27 | End: 2021-04-28

## 2021-04-26 RX ORDER — PHENOBARBITAL 64.8 MG/1
64.8 TABLET ORAL 2 TIMES DAILY
Status: DISCONTINUED | OUTPATIENT
Start: 2021-04-28 | End: 2021-04-28

## 2021-04-26 RX ORDER — PHENOBARBITAL 64.8 MG/1
64.8 TABLET ORAL 4 TIMES DAILY
Status: COMPLETED | OUTPATIENT
Start: 2021-04-26 | End: 2021-04-27

## 2021-04-26 RX ORDER — LORAZEPAM 1 MG/1
1 TABLET ORAL EVERY 6 HOURS PRN
Status: DISCONTINUED | OUTPATIENT
Start: 2021-04-26 | End: 2021-04-29 | Stop reason: HOSPADM

## 2021-04-26 RX ADMIN — PHENOBARBITAL 64.8 MG: 64.8 TABLET ORAL at 13:29

## 2021-04-26 RX ADMIN — PHENOBARBITAL 64.8 MG: 64.8 TABLET ORAL at 16:39

## 2021-04-26 RX ADMIN — OLANZAPINE 5 MG: 5 TABLET, ORALLY DISINTEGRATING ORAL at 15:23

## 2021-04-26 RX ADMIN — OLANZAPINE 5 MG: 5 TABLET, ORALLY DISINTEGRATING ORAL at 03:27

## 2021-04-26 RX ADMIN — LORAZEPAM 1 MG: 1 TABLET ORAL at 08:02

## 2021-04-26 RX ADMIN — GABAPENTIN 600 MG: 600 TABLET, FILM COATED ORAL at 08:02

## 2021-04-26 RX ADMIN — OLANZAPINE 5 MG: 5 TABLET, ORALLY DISINTEGRATING ORAL at 08:57

## 2021-04-26 RX ADMIN — LORAZEPAM 1 MG: 1 TABLET ORAL at 16:39

## 2021-04-26 RX ADMIN — PHENOBARBITAL 64.8 MG: 64.8 TABLET ORAL at 20:16

## 2021-04-26 NOTE — PROVIDER NOTIFICATION
Paged Psychiatry Pj Alex:  Pt in 535 (I,L) states anxiety worsened after first dose of phenobarbital. Given last dose of PRN zyprexa. Pt extremely restless and trying to leave unit at times. Any suggestions for us?  Thanks  Ronna  [phone]

## 2021-04-26 NOTE — CONSULTS
"    Psychiatry Consultation; Follow up          Reason for Consult, requesting source:    Re-eval BZD w/d, ativan dosing, transfer to psych, on 72 hour hold  Requesting source: Mago Myers          Interim history:    Mr. Carmelo Coello is a 26-year-old male who was admitted to [facility] on 4/23/21 after presenting to the emergency department by a friend due to concern for psychosis related to benzodiazepine use and possible withdrawal. Psychiatry is consulted for management of his benzodiazepine withdrawal.     Patient was evaluated by KATHY Yun, CNP over the weekend. Those notes were reviewed. Thus far, patient managed on gabapentin and scheduled lorazepam. He continues to experience withdrawal symptoms. He appears to respond to internal stimuli. He is disorganized. He has had mild elevations in temperature and blood pressure. Has was tachycardic yesterday. O2 sats in the mid- to upper 90s.     On interview today, patient is seen in his room. He quickly becomes disorganized when responding to questions and has a difficult time recalling the questions being asked. He becomes tangential with loose associations. He at times believes he is here because he applied for a job. He endorses both auditory and visual hallucinations. He is seen grasping at things in the air which are not there. He appears to come in and out of reality. He reports that he had been using benzodiazepines that he bought off the Internet. He showed our hospitalist what he had been using, appears to have been clonazepam. He told Maryana that he had been using a variety of benzos, including one called flubromazolam.     In a brief evaluation of cognition, patient was unable to spell the word \"world\" forward or backward. He was unable to give the number of quarters in $1.75. He was unable to perform serial 7s. Stated the president was Lalito Ocampo, then corrected to James Mares. He was unable to state where we are today. He did " "not know it was his birthday, but stated it was April [birth year]th, 2021.     He is currently agreeable to remain in the hospital.          Medications:     Current Facility-Administered Medications   Medication     acetaminophen (TYLENOL) tablet 650 mg     lactated ringers infusion     lidocaine (LMX4) cream     lidocaine 1 % 0.1-1 mL     melatonin tablet 1 mg     OLANZapine zydis (zyPREXA) ODT tab 5 mg     ondansetron (ZOFRAN-ODT) ODT tab 4 mg    Or     ondansetron (ZOFRAN) injection 4 mg     PHENobarbital (LUMINAL) tablet 64.8 mg    Followed by     [START ON 4/27/2021] PHENobarbital (LUMINAL) tablet 64.8 mg    Followed by     [START ON 4/28/2021] PHENobarbital (LUMINAL) tablet 64.8 mg    Followed by     [START ON 4/29/2021] PHENobarbital (LUMINAL) tablet 32.4 mg     sodium chloride (PF) 0.9% PF flush 3 mL     sodium chloride (PF) 0.9% PF flush 3 mL              MSE:     Appearance: age-appearing, dressed in orange scrubs, wearing glasses, unkempt hair  Behavior: disorganized, restless, but cooperative with questions  Orientation: alert and oriented to self. Not oriented to situation, or place. Off date by 3 days.   Movements: tremulous in bilateral upper extremities, jerking movements noted at times in extremities.   Mood: \"okay\"  Affect: blunted, mood-congruent, stable  Speech: normal rate and tone, mildly tangential  Memory: no impairment in immediate, recent, or remote memory  Intellectual capacity: Average for development based on word choice  Concentration: distracted, impaired ability to concentrate  Thought process and content: disorganized, tangential, loosely associated. Endorses auditory and visual hallucinations. He is responding to internal stimuli. No paranoia observed.   Insight: impaired  Judgement: impaired  Safety: does not voice any suicidal or homicidal ideation      Vital signs:  Temp: 99.2  F (37.3  C) Temp src: Oral BP: 119/65 Pulse: 68   Resp: 16 SpO2: 95 % O2 Device: None (Room air)     Weight: " 77.9 kg (171 lb 11.2 oz)  There is no height or weight on file to calculate BMI.              DSM-5 Diagnosis:   #1 Benzodiazepine use disorder, severe  #2 Benzodiazepine withdrawal, severe, complicated by auditory and visual hallucinations  #3 Encephalopathy secondary to benzodiazepine withdrawal  #4 Unspecified depressive disorder, by history  #5 Generalized anxiety disorder, by history          Assessment:   Mr. Carmelo Coello is a 26-year-old male who was admitted for management of benzodiazepine withdrawal after presenting to the emergency department with symptoms of psychosis and disorganized behavior. Patient seen by Maryana Ramos over the weekend, started on lorazepam 1 mg TID as well as gabapentin 600 mg tid.     Patient continues to experience severe withdrawals, complicated by auditory and visual hallucinations. It is still unclear exactly what benzodiazepines he was taking, as he was obtaining from the Internet, but it sounds as though it could have been several different compounds. He tells me he had been using for the past 6 to 8 months. 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 He is currently disoriented to place and situation. He is actively hallucinating and responding to internal stimuli. His ability to concentrate is severely impaired and he often forgets the question several seconds after being asked. He is seen attempting to perform several tasks which this writer could not see. His withdrawal symptoms do have the potential to become more severe and we will have to monitor closely. For now, would recommend we start phenobarbital as the scheduled lorazepam and gabapentin do not appear effective. Will order a phenobarbital taper, beginning with 64.8 mg QID x 4 doses, then 64.8 TID x 3 doses, then 64.8 BID x 2 doses, then to 32.4 mg x 2 doses, then discontinue. Will continue to  monitor for improvements.           Summary of Recommendations:   1) Discontinue lorazepam and gabapentin    2) Start phenobarbital taper - 64.8 mg QID x 4 doses, then 64.8 TID x 3 doses, then 64.8 BID x 2 doses, then to 32.4 mg x 2 doses, then discontinue. First dose will be today at noon, so some doses will be carried over into the following day.     3) Continue to monitor vital signs and respiratory status closely. Monitor for seizures.     4) Continue 72 hour hold at this time. Patient does not have the capacity to safely care for himself at this time due to acute encephalopathy. Will continue to re-evaluate.      5) Continue 1:1 bedside attendant for safety    6) Page me with additional questions or concerns, thank you.

## 2021-04-26 NOTE — PROGRESS NOTES
[facility]    Medicine Progress Note - Hospitalist Service       Date of Admission:  4/23/2021  Assessment & Plan       Carmelo Coello is a 25 year old male admitted on 4/23/2021. He has history of anxiety and depression, and was brought to the hospital by a friend due to concern for psychosis related to benzodiazepine use and possible withdrawal.      Benzodiazepine use disorder ( synthetic)  Acute psychosis due to benzodiazepine use versus withdrawal versus underlying psychiatric condition   Acute encephalopathy likely due to medications   Patient with severe visual and auditory hallucinations. He is being followed by psychiatry team, who has initially started on scheduled Lorazepam TID and Gabapentin  However, given hiss trajectory, both these meds are now stopped and patient is initiated on Phenobarbitone ( ordered by psych)  They expect a prolonged withdrawal   Continue 72 hr hold for now per psych ( valid until 4/29)            Low Grade Fever   No sign of infection ( UA negative ,wbc normal )  Monitor          Diet: Regular Diet Adult    DVT Prophylaxis: mechanical . ambulating   Haney Catheter: not present  Code Status: Full Code                The patient's care was discussed with the Bedside Nurse, Patient and psychiatry .    Phong Griffith MD  Hospitalist Service  [facility]  Contact information available via [facility] Paging/Directory    ______________________________________________________________________    Interval History    Met patient with RN this morning. He has intermittent periods of lucid conversations, but goes into a tangential with unrelated matter  Has intermittent twitches as well  Denies chest pain/ SOB  No fever or chills     Data reviewed today: I reviewed all medications, new labs and imaging results over the last 24 hours    Physical Exam   Vital Signs: Temp: 99.2  F (37.3  C) Temp  src: Oral BP: 119/65 Pulse: 68   Resp: 16 SpO2: 95 % O2 Device: None (Room air)    Weight: 171 lbs 11.2 oz  Constitutional: awake, alert, cooperative, no apparent distress, and appears stated age  Confused about whu he is in hospital . No clonus or rigidity   Eyes: Lids and lashes normal, pupils equal, round and reactive to light, extra ocular muscles intact, sclera clear, conjunctiva normal  Hematologic / Lymphatic: no cervical lymphadenopathy  Respiratory: No increased work of breathing, good air exchange, clear to auscultation bilaterally, no crackles or wheezing  Cardiovascular: Normal apical impulse, regular rate and rhythm, normal S1 and S2, no S3 or S4, and no murmur noted  GI: No scars, normal bowel sounds, soft, non-distended, non-tender, no masses palpated, no hepatosplenomegally    Data   Recent Labs   Lab 04/26/21  0513 04/25/21  0542 04/23/21  0730   WBC 9.2 7.3 6.6   HGB 15.0 14.8 14.8   MCV 95 95 93    235 239    142 141   POTASSIUM 3.9 3.6 3.9   CHLORIDE 107 108 104   CO2 29 27 29   BUN 14 13 17   CR 1.08 0.96 1.13   ANIONGAP 6 7 8   JOSE 9.1 8.6 9.2   GLC 96 106* 104*   ALBUMIN 4.3 3.9 4.2   PROTTOTAL 7.7 7.0 7.5   BILITOTAL 0.6 0.5 0.5   ALKPHOS 59 54 59   ALT 17 15 18   AST 9 8 12

## 2021-04-26 NOTE — CONSULTS
"4/26/2021    CD consult acknowledged and closing at this time.   Pt not appropriate for CD evaluation/interview.   Per psych note from today:  \"On interview today, patient is seen in his room. He quickly becomes disorganized when responding to questions and has a difficult time recalling the questions being asked. He becomes tangential with loose associations. He at times believes he is here because he applied for a job. He endorses both auditory and visual hallucinations. He is seen grasping at things in the air which are not there. He appears to come in and out of reality. He reports that he had been using benzodiazepines that he bought off the Internet. He showed our hospitalist what he had been using, appears to have been clonazepam. He told Maryana that he had been using a variety of benzos, including one called flubromazolam.     In a brief evaluation of cognition, patient was unable to spell the word \"world\" forward or backward. He was unable to give the number of quarters in $1.75. He was unable to perform serial 7s. Stated the president was Lalito Ocampo, then corrected to James Mares. He was unable to state where we are today. He did not know it was his birthday, but stated it was April [birth year]th, 2021.\"    CD eval can be reordered when pt is able to have a meaningful conversation about his substance use and able to identify if he wants HUGO treatment, not have auditory or visual hallucinations and clears cognitively.     KAROL Rivsa  [email or web address]  [phone]    "

## 2021-04-26 NOTE — PROGRESS NOTES
Pt gave verbal consent for writer to speak with visitor. Visitor is pts roommate who brought pt into ED. Requested to speak with writer in private in [facility]. Discussed concerns about pt being discharged on benzodiazepines and concern for future abuse if discharged on these medications. Visitor also stated pt was drinking at home at least every other night hard alcohol while using benzodiazepines. Visitor provided list of some drugs pt has taken in past with pt consent and placed in chart for MD to review tomorrow.     Visitor also requesting staff to take away patient's phone, explained this is a patient's right to have their own belongings. Visitor expressed concerns of pt messaging boss or manager and losing job as pt is actively hallucinating and their rent is due soon in May. Will pass on concerns to oncoming staff.
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
## 2021-04-26 NOTE — PLAN OF CARE
"Pt alert and oriented to self and place. VSS. No complaints of shortness of breath or chest pain. Denied pain. CMS intact. Pt did not sleep well last night. Became restless and impulsive. PRN zyprexa given around 0330. Was having visual and auditory hallucinations. Up with SBA, no device. Steady gait but is impulsive. Tried leaving the room once stating, \" I need to go find aneesh\". Pt reoriented to situation. Regular diet, thin liquids. Takes pills whole. Pt states he has had a poor appetite. Continent of bowel and bladder. LBM: 4/24. Skin intact. No lines or drains. Pt took a shower this AM. Scrubs changed. Inconsistent with call light use. 1:1 bedside attendant for pt safety. Currently on court hold that will end on the 29th of April at noon. Will continue with plan of care.   Today is pts birthday.   "

## 2021-04-26 NOTE — PLAN OF CARE
VS: VSS  Temp: 98.9  F (37.2  C) Temp src: Oral BP: 115/72 Pulse: 79   Resp: 16 SpO2: 95 % O2 Device: None (Room air)     O2: >90% on RA, denies SOB or CP. No cough noted.   Output: Voiding without difficulty in BR   Last BM: LBM 4/25 per pt report, BS active   Activity: Up ad radames, moderately restless and fidgeting today. Attempted to leave unit a few times but easily redirectable to room.    Skin: Skin intact, warm, and dry   Pain: Denies pain   CMS: CMS intact, does report intermittent tingling in bilateral hands. Radial pulse +2 bilaterally.    Dressing: None   Diet: Regular diet, good appetite. Denies N/V. Encouraged PO fluids   LDA: No PIV access   Equipment: 1:1 sitter for safety   Plan: Start phenobarbital taper per psychiatry. Possible transfer to psychiatry. 72 hr hold until 4/29   Additional Info: Pt is able to have direct conversations and answer questions appropriately but will often go into tangents of illogical statements and appears to not be in touch with reality at times. Pt constantly responding to internal stimuli in room and appears quite restless. PRN zyprexa given x2 for impulsiveness with little effect. Pt does endorse auditory and visual hallucinations at times.     Pt reported increasing anxiety after first dose of phenobarbital, paged Psychiatry and added on PRN ativan 1mg Q6H. Given x1.    Today is pts birthday, designated his friend Jan to visit him in hospital and came to visit this afternoon.    MSSA 7, 15, 14

## 2021-04-27 PROCEDURE — 120N000002 HC R&B MED SURG/OB UMMC

## 2021-04-27 PROCEDURE — 250N000013 HC RX MED GY IP 250 OP 250 PS 637: Performed by: NURSE PRACTITIONER

## 2021-04-27 PROCEDURE — 99232 SBSQ HOSP IP/OBS MODERATE 35: CPT | Performed by: HOSPITALIST

## 2021-04-27 PROCEDURE — 250N000013 HC RX MED GY IP 250 OP 250 PS 637: Performed by: STUDENT IN AN ORGANIZED HEALTH CARE EDUCATION/TRAINING PROGRAM

## 2021-04-27 PROCEDURE — 99231 SBSQ HOSP IP/OBS SF/LOW 25: CPT | Performed by: NURSE PRACTITIONER

## 2021-04-27 RX ADMIN — PHENOBARBITAL 64.8 MG: 64.8 TABLET ORAL at 20:08

## 2021-04-27 RX ADMIN — ACETAMINOPHEN 650 MG: 325 TABLET, FILM COATED ORAL at 20:12

## 2021-04-27 RX ADMIN — PHENOBARBITAL 64.8 MG: 64.8 TABLET ORAL at 15:18

## 2021-04-27 RX ADMIN — PHENOBARBITAL 64.8 MG: 64.8 TABLET ORAL at 09:12

## 2021-04-27 NOTE — PROGRESS NOTES
[facility], [place], [place]  Internal Medicine Progress Note      Assessment and Plans:     Carmelo Coello is a 25 year old male admitted on 4/23/2021. He has history of anxiety and depression, and was brought to the hospital by a friend due to concern for psychosis related to benzodiazepine use and possible withdrawal.      Benzodiazepine use disorder ( synthetic)  Acute psychosis due to benzodiazepine use versus withdrawal versus underlying psychiatric condition   Acute encephalopathy likely due to medications     Patient with severe visual and auditory hallucinations. He is being followed by psychiatry team, who has initially started on scheduled Lorazepam TID and Gabapentin  However, given hiss trajectory, both these meds are now stopped and patient is initiated on Phenobarbitone ( ordered by psych)  They expect a prolonged withdrawal   Continue 72 hr hold for now per psych ( valid until 4/29)      Low Grade Fever   No sign of infection ( UA negative ,wbc normal )  Monitor         Diet: Regular Diet Adult    DVT Prophylaxis: mechanical . ambulating   Haney Catheter: not present  Code Status: Full Code           Bran Gillette MD  Text Page  (7am - 5pm, M-F)    Subjective:       Overnight Events reviewed, Chart Reviewed  He is still getting hallucinations.   He did not sleep well, though told me that he slept well.   No sizures.   No fevers. No sweats.        -Data reviewed today: I reviewed all new labs and imaging results over the last 24 hours.     Exam:         Temp: 98.5  F (36.9  C) Temp src: Oral BP: 132/81 Pulse: 76   Resp: 16 SpO2: 94 % O2 Device: None (Room air)    Vitals:    04/23/21 0658   Weight: 77.9 kg (171 lb 11.2 oz)     Vital Signs with Ranges  Temp:  [97  F (36.1  C)-98.9  F (37.2  C)] 98.5  F (36.9  C)  Pulse:  [76-98] 76  Resp:  [16-17] 16  BP: (115-138)/(66-81) 132/81  SpO2:  [94 %-95 %] 94 %  I/O last 3 completed  shifts:  In: 740 [P.O.:740]  Out: -     Constitutional: No distress noted, Alert, Answering questions appropriately  Respiratory: AE is goog on both sides, no wheezing onr crackles  Cardiovascular: S1S2 normal, no new murmur  GI: Soft, non tender  Skin/Integumen: No rash      Medications     lactated ringers 125 mL/hr at 04/24/21 2337       PHENobarbital  64.8 mg Oral TID    Followed by     [START ON 4/28/2021] PHENobarbital  64.8 mg Oral BID    Followed by     [START ON 4/29/2021] PHENobarbital  32.4 mg Oral BID     sodium chloride (PF)  3 mL Intracatheter Q8H       Data   Recent Labs   Lab 04/26/21  0513 04/25/21  0542 04/23/21  0730   WBC 9.2 7.3 6.6   HGB 15.0 14.8 14.8   MCV 95 95 93    235 239    142 141   POTASSIUM 3.9 3.6 3.9   CHLORIDE 107 108 104   CO2 29 27 29   BUN 14 13 17   CR 1.08 0.96 1.13   ANIONGAP 6 7 8   JOSE 9.1 8.6 9.2   GLC 96 106* 104*   ALBUMIN 4.3 3.9 4.2   PROTTOTAL 7.7 7.0 7.5   BILITOTAL 0.6 0.5 0.5   ALKPHOS 59 54 59   ALT 17 15 18   AST 9 8 12       No results found for this or any previous visit (from the past 24 hour(s)).

## 2021-04-27 NOTE — CONSULTS
"    Psychiatry Consultation; Follow up          Reason for Consult, requesting source:    Follow-up BZD withdrawal  Requesting source: Mago Myers          Interim history:    Mr. Carmelo Coello is a 26-year-old male who was admitted to [facility] on 4/23/21 after presenting to the emergency department by a friend due to concern for psychosis related to benzodiazepine use and possible withdrawal. Psychiatry is consulted for management of his benzodiazepine withdrawal.     On interview today, patient is seen calmly seated in chair. When I enter, he immediately stands up, but is redirectable to sit back down. He is disoriented to situation, place, and date. He is not able to recall any information from the past day. When asked if he feels anxious, he reports \"not particularly.\" His tremors are vastly improved today. His thoughts are rather illogical and loosely associated. He is unable to appropriately answer most questions. He believes we are in some sort of government building. His eyes remained closed throughout interview.     Per RN, he was apparently quite disorganized in behavior this morning. He was in his room, disrobed, appearing somewhat agitated.          Medications:     Current Facility-Administered Medications   Medication     acetaminophen (TYLENOL) tablet 650 mg     lactated ringers infusion     lidocaine (LMX4) cream     lidocaine 1 % 0.1-1 mL     LORazepam (ATIVAN) tablet 1 mg     melatonin tablet 1 mg     OLANZapine zydis (zyPREXA) ODT tab 5 mg     ondansetron (ZOFRAN-ODT) ODT tab 4 mg    Or     ondansetron (ZOFRAN) injection 4 mg     PHENobarbital (LUMINAL) tablet 64.8 mg    Followed by     [START ON 4/28/2021] PHENobarbital (LUMINAL) tablet 64.8 mg    Followed by     [START ON 4/29/2021] PHENobarbital (LUMINAL) tablet 32.4 mg     sodium chloride (PF) 0.9% PF flush 3 mL     sodium chloride (PF) 0.9% PF flush 3 mL              MSE:     Appearance: age-appearing, seated in bedside chair, " "eyes closed, in no apparent distress  Behavior: cooperative and calm, redirectable  Orientation: alert and confused. Oriented to self. Not oriented to place, date, or situation.   Movements: did not observe any tics, tremors, or dystonia today  Mood: \"fine\"  Affect: flat, mood-congruent  Speech: Normal rate, rhythm, tone  Memory: recent memory is impaired based on limited recall; remote memory grossly intact  Intellectual capacity: Average for development based on word choice  Concentration: attentive to interview  Thought process and content: disorganized, illogical; loosely associated. Denies auditory or visual hallucinations. No paranoia noted.   Insight: impaired  Judgement: impaired  Safety: denies suicidal or homicidal ideation, plan, or intent      Vital signs:  Temp: 98.5  F (36.9  C) Temp src: Oral BP: 132/81 Pulse: 76   Resp: 16 SpO2: 94 % O2 Device: None (Room air)     Weight: 77.9 kg (171 lb 11.2 oz)  There is no height or weight on file to calculate BMI.              DSM-5 Diagnosis:   #1 Benzodiazepine use disorder, severe  #2 Benzodiazepine withdrawal, severe, complicated by auditory and visual hallucinations  #3 Encephalopathy secondary to benzodiazepine withdrawal  #4 Unspecified depressive disorder, by history  #5 Generalized anxiety disorder, by history          Assessment:   Mr. Carmelo Coello is a 26-year-old male who was admitted for management of benzodiazepine withdrawal after presenting to the emergency department with symptoms of psychosis and disorganized behavior.    Yesterday, stopped gabapentin and scheduled lorazepam and started phenobarbital taper. Patient appears to be tolerating this well. His tremors have improved quite a bit. He unfortunately remains delirious and thoughts are not based in reality. He does not appear to respond to internal stimuli today, as was observed yesterday. He is disoriented to place, date, and situation. Will hope his mental status will begin to improve over " the next couple of days. Vital signs appear stable over the past 12-18 hours.     Continue phenobarbital taper: 64.8 mg QID x 4 doses, then 64.8 TID x 3 doses, then 64.8 BID x 2 doses, then to 32.4 mg x 2 doses, then discontinue.          Summary of Recommendations:   1) Continue phenobarbital taper     2) Continue to assess for mental status changes/improvement. Physically, he appears to be doing better today, but remains encephalopathic.    3) Continue 72 hour hold for now. He is not currently demanding to leave and compliant with care so I do not believe we need to pursue commitment.     4) Continue 1:1 attendant due to confusion    5) CD evaluation when mentation clears    6) Page me with additional questions or concerns, thank you.       KATHY Johns, [facility]   Contact information available via [facility] Paging/Directory

## 2021-04-27 NOTE — PLAN OF CARE
"VS: VSS   O2: >90% on RA.   Output: Voiding without difficulty in BR   Last BM: LBM 4/25 per pt report   Activity: Up ad radames, moderately restless and fidgety. Walked halls several times.   Skin: Skin intact, warm, and dry   Pain: Denies pain   CMS: CMS intact, does report intermittent tingling in bilateral hands.    Dressing: None   Diet: Regular diet, good appetite.   LDA: No PIV access   Equipment: 1:1 sitter for safety   Plan: On Phenobarbital taper per psychiatry. Possible transfer to psychiatry. 72 hr hold until 4/29 at noon.   Additional Info: Per Ronna Deal: \"Pt is able to have direct conversations and answer questions appropriately but will often go into tangents of illogical statements and appears to not be in touch with reality at times. Pt constantly responding to internal stimuli in room and appears quite restless. Pt does endorse auditory and visual hallucinations at times.\" - this is unchanged     0500: Pt lying in bed with eyes closed/twitching, talking to either internal stimuli or hallucinations. Content of conversation is varied; it sounds like he is talking to a friend.    MSSA Q4H, done by RN sitter overnight.      "

## 2021-04-27 NOTE — PLAN OF CARE
VS: VSS, pt denied CP or SOB.   O2: Room air sat. > 90%.   Output: Voiding adequate amount in the bathroom.    Last BM: 04/27/21.   Activity: Up independent in the room.   Skin: Intact.   Pain: Denied pain.   CMS: CMS and neuro intact to baseline.    Dressing: None.    Diet: Regular tolerating okay.    LDA: PIV infusing.   Equipment: IV pole and personal belongings.    Plan: TBD.   Additional Info: Pt was disoriented x 4 the morning, was able to sleep after 12 pm for about 3 hours, this evening pt was A&O X 4, was able to eat late lunch up sitting on chair.pt back to bed sleeping at this time, bedside attendant in the room for safety, pt on 72 hour hold till 4/29. Will continue to monitor.

## 2021-04-28 PROCEDURE — 250N000013 HC RX MED GY IP 250 OP 250 PS 637: Performed by: NURSE PRACTITIONER

## 2021-04-28 PROCEDURE — 258N000003 HC RX IP 258 OP 636: Performed by: HOSPITALIST

## 2021-04-28 PROCEDURE — 120N000002 HC R&B MED SURG/OB UMMC

## 2021-04-28 PROCEDURE — 250N000013 HC RX MED GY IP 250 OP 250 PS 637: Performed by: STUDENT IN AN ORGANIZED HEALTH CARE EDUCATION/TRAINING PROGRAM

## 2021-04-28 PROCEDURE — 99232 SBSQ HOSP IP/OBS MODERATE 35: CPT | Performed by: NURSE PRACTITIONER

## 2021-04-28 PROCEDURE — 99232 SBSQ HOSP IP/OBS MODERATE 35: CPT | Performed by: HOSPITALIST

## 2021-04-28 RX ORDER — PHENOBARBITAL 32.4 MG/1
32.4 TABLET ORAL 2 TIMES DAILY
Status: DISCONTINUED | OUTPATIENT
Start: 2021-04-28 | End: 2021-04-29 | Stop reason: HOSPADM

## 2021-04-28 RX ADMIN — PHENOBARBITAL 32.4 MG: 32.4 TABLET ORAL at 20:16

## 2021-04-28 RX ADMIN — ACETAMINOPHEN 650 MG: 325 TABLET, FILM COATED ORAL at 20:16

## 2021-04-28 RX ADMIN — SODIUM CHLORIDE, POTASSIUM CHLORIDE, SODIUM LACTATE AND CALCIUM CHLORIDE: 600; 310; 30; 20 INJECTION, SOLUTION INTRAVENOUS at 08:32

## 2021-04-28 RX ADMIN — PHENOBARBITAL 64.8 MG: 64.8 TABLET ORAL at 08:31

## 2021-04-28 NOTE — CONSULTS
"      Psychiatry Consultation; Follow up          Reason for Consult, requesting source:    F/u benzo withdrawal  Requesting source: Mago Myers          Interim history:    Mr. Carmelo Coello is a 26-year-old male who was admitted to [facility] on 4/23/21 after presenting to the emergency department by a friend due to concern for psychosis related to benzodiazepine use and possible withdrawal. Psychiatry is consulted for management of his benzodiazepine withdrawal.     Mr. Coello is seen in his hospital room this morning. He is somnolent but awakens easily when I enter his room. He reports feeling \"okay\" physically, but is feeling a bit down about his current situation, being hospitalized. He reports he would be agreeable to outpatient chemical dependency resources. Reports he was using the benzodiazepines to manage his anxiety. Discussed our recommendation that he no longer use any benzodiazepines. Discussed that seeing a therapist may be helpful for alternative ways to deal with his anxiety. He denies any thoughts of wanting to harm himself or others. He is oriented to place, date, and situation.          Medications:     Current Facility-Administered Medications   Medication     acetaminophen (TYLENOL) tablet 650 mg     lactated ringers infusion     lidocaine (LMX4) cream     lidocaine 1 % 0.1-1 mL     LORazepam (ATIVAN) tablet 1 mg     melatonin tablet 1 mg     OLANZapine zydis (zyPREXA) ODT tab 5 mg     ondansetron (ZOFRAN-ODT) ODT tab 4 mg    Or     ondansetron (ZOFRAN) injection 4 mg     PHENobarbital (LUMINAL) tablet 32.4 mg     sodium chloride (PF) 0.9% PF flush 3 mL     sodium chloride (PF) 0.9% PF flush 3 mL              MSE:     Appearance: age-appearing, somnolent, mildly unkempt, cooperative, in no acute distress  Behavior: cooperative and calm  Orientation: alert and oriented to time, place and person  Movements: no tics, tremors or dystonia  Mood: \"not great\"  Affect: mood-congruent, " 82yo F current-smoker (>65 pack-year), with hx of multiple lung cancers (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 Wellington.   Presenting on 2/7/21 with several days of worsening SOB and palpitations,   Found in AFib w/ RVR and moderate R-sided pleural effusion     #Pleural Effusion - Right-sided, moderate, simple-appearing. Either malignant or consequence of possible trapped lung related to worsening of right hilum malignant lesions causing segmental right lower lobe airway obstruction with subsequent atelectasis. Low concern for infection. Appears comfortable, not HD-unstable. Fluid started to be seen  on PET from 8/2020, but now grown much larger in size as also seen on per from january 2021.    - Thoracentesis now planed for 2/9  - No emergent need for drainage  - If malignant with rapid re-accumulation, return of symptoms, will benefit from  in-dwelling pleural catheter    mildly dysphoric, stable  Speech: Normal rate, rhythm, tone  Memory: recent memory appears impaired; remote memory grossly intact based on recall  Intellectual capacity: Average for development based on word choice  Concentration: attentive to interview  Thought process and content: linear, coherent. Denies AH/VH. Does not appear to respond to internal stimuli at this time. No delusions or paranoia noted.   Insight: fair  Judgement: fair  Safety: denies suicidal or homicidal ideation, plan, or intent    Vital signs:  Temp: 97.4  F (36.3  C) Temp src: Oral BP: 123/62 Pulse: 65   Resp: 16 SpO2: 97 % O2 Device: None (Room air)     Weight: 77.9 kg (171 lb 11.2 oz)  There is no height or weight on file to calculate BMI.            DSM-5 Diagnosis:   #1 Benzodiazepine use disorder, severe  #2 Benzodiazepine withdrawal, severe, complicated by auditory and visual hallucinations  #3 Encephalopathy secondary to benzodiazepine withdrawal, improving  #4 Unspecified depressive disorder, by history  #5 Generalized anxiety disorder, by history          Assessment:   Mr. Carmelo Coello is a 26-year-old male who was admitted for management of benzodiazepine withdrawal after presenting to the emergency department with symptoms of psychosis and disorganized behavior.    Appears to be tolerating the phenobarbital taper well. He is a bit somnolent today. He is no longer responding to internal stimuli and is now fully oriented. The reality of his situation is beginning to set in and he appears a bit dysphoric. Will reassess mood tomorrow to evaluate whether he would benefit from an SSRI. He is not currently interested in inpatient CD treatment but is open to outpatient resources.     Will discontinue 72 hour hold - pt compliant with medical care and not asking to leave. Will discontinue bedside attendant and suicide precautions.            Summary of Recommendations:   1) Discontinue 72 hour hold. Discontinue 1:1 bedside attendant.     2)  DANIKA consult for outpatient CD resources    3) Patient may benefit from an SSRI for mood and anxiety. Will re-evaluate tomorrow morning.     4) Will decrease phenobarbital to 32.4 mg this evening, give 2 more doses tomorrow, then discontinue. He will likely be ready for discharge in 1-2 days.      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 Wellington.   Presenting on 2/7/21 with several days of worsening SOB and palpitations,   Found in AFib w/ RVR and moderate R-sided pleural effusion     #Pleural Effusion - Right-sided, moderate, simple-appearing. Either malignant or consequence of possible trapped lung related to worsening of right hilum malignant lesions causing segmental right lower lobe airway obstruction with subsequent atelectasis. Low concern for infection. Appears comfortable, not HD-unstable. Fluid started to be seen  on PET from 8/2020, but now grown much larger in size as also seen on per from january 2021.    - S/p thoracentesis this afternoon.   - Will Await post procedure imaging.   - F/u pleural fluid labs and cytology.   - If malignant with rapid re-accumulation, return of symptoms, will benefit from  in-dwelling pleural catheter

## 2021-04-28 NOTE — PLAN OF CARE
VS: VSS, pt A&O x 4, pt denied CP or SOB.   O2: Room air sat. > 90%.   Output: Voiding adequate amount without difficulty.    Last BM: 04/27/21   Activity: Up independent in the room.    Skin: Intact.   Pain: Denied pin or discomfort.    CMS: CMS and neuro intact.    Dressing: None.    Diet: Regular tolerating okay.    LDA: PIV SL.    Equipment: IV pole and personal belongings.    Plan: TBD.    Additional Info: Bedside attendant dcd pt cooperative and makes need known, MSSA score 3, call light with in reach, will continue to monitor.

## 2021-04-28 NOTE — PROGRESS NOTES
[facility], [place], [place]  Internal Medicine Progress Note      Assessment and Plans:     Carmelo Coello is a 25 year old male admitted on 4/23/2021. He has history of anxiety and depression, and was brought to the hospital by a friend due to concern for psychosis related to benzodiazepine use and possible withdrawal.      Benzodiazepine use disorder ( synthetic)  Acute psychosis due to benzodiazepine use versus withdrawal versus underlying psychiatric condition   Acute encephalopathy likely due to medications     Patient with severe visual and auditory hallucinations. He is being followed by psychiatry team, who has initially started on scheduled Lorazepam TID and Gabapentin.  However, given hiss trajectory, both these meds are now stopped and patient is initiated on Phenobarbitone ( ordered by psych)  Withdrawal is clinically better.   Seen by psychiatry today. They discontinued  72 hr hold  They discontinued the sitter.   Continue phenobarbital as planned  Anticipate discharge in 2 days.       Low Grade Fever   No sign of infection ( UA negative ,wbc normal )  Monitor         Diet: Regular Diet Adult    DVT Prophylaxis: mechanical . ambulating   Haney Catheter: not present  Code Status: Full Code           Bran Gillette MD  Text Page  (7am - 5pm, M-F)    Subjective:       Overnight Events reviewed, Chart Reviewed  Improving. Less hallucination. Less confused and agitation.   Less withdrawal.        -Data reviewed today: I reviewed all new labs and imaging results over the last 24 hours.     Exam:         Temp: 97.2  F (36.2  C) Temp src: Oral BP: 125/52 Pulse: 72   Resp: 16 SpO2: 98 % O2 Device: None (Room air)    Vitals:    04/23/21 0658   Weight: 77.9 kg (171 lb 11.2 oz)     Vital Signs with Ranges  Temp:  [96.1  F (35.6  C)-97.4  F (36.3  C)] 97.2  F (36.2  C)  Pulse:  [59-74] 72  Resp:  [16] 16  BP: (111-128)/(52-83) 125/52  SpO2:   [97 %-98 %] 98 %  I/O last 3 completed shifts:  In: 420 [P.O.:420]  Out: -     Constitutional: No distress noted, Alert, Answering questions appropriately  Respiratory: AE is goog on both sides, no wheezing onr crackles  Cardiovascular: S1S2 normal, no new murmur  GI: Soft, non tender  Skin/Integumen: No rash      Medications     lactated ringers 100 mL/hr at 04/28/21 0832       PHENobarbital  32.4 mg Oral BID     sodium chloride (PF)  3 mL Intracatheter Q8H       Data   Recent Labs   Lab 04/26/21  0513 04/25/21  0542 04/23/21  0730   WBC 9.2 7.3 6.6   HGB 15.0 14.8 14.8   MCV 95 95 93    235 239    142 141   POTASSIUM 3.9 3.6 3.9   CHLORIDE 107 108 104   CO2 29 27 29   BUN 14 13 17   CR 1.08 0.96 1.13   ANIONGAP 6 7 8   JOSE 9.1 8.6 9.2   GLC 96 106* 104*   ALBUMIN 4.3 3.9 4.2   PROTTOTAL 7.7 7.0 7.5   BILITOTAL 0.6 0.5 0.5   ALKPHOS 59 54 59   ALT 17 15 18   AST 9 8 12       No results found for this or any previous visit (from the past 24 hour(s)).

## 2021-04-28 NOTE — PLAN OF CARE
VS: VSS   O2: Room air sat. > 90%.   Output: Voiding without difficulty in bathroom   Last BM: 04/27/21.   Activity: Up independent in the room.   Skin: Intact.   Pain: Denied pain.   CMS: CMS and neuro intact to baseline.    Dressing: None.    Diet: Regular   LDA: PIV infusing.   Equipment: IV pole and personal belongings.    Plan: TBD.   Additional Info: Pt disoriented to situation and time. Calm and cooperative this shift. Slept most of shift. Pt on 72 hour hold till 4/29. Will continue to monitor.

## 2021-04-28 NOTE — PLAN OF CARE
VS: /52 (BP Location: Right arm)   Pulse 72   Temp 97.2  F (36.2  C) (Oral)   Resp 16   Wt 77.9 kg (171 lb 11.2 oz)   SpO2 98%    O2: O2 sat 98% on RA, denies SOB or respiratory distress    Output: Voiding adequate amounts with use of toilet    Last BM: 4/27   Activity: Independent with bed mobility, transfers and toileting    Up for meals? Sat on side of bed for meals    Skin: Intact   Pain: Denies pain or discomfort    CMS: Alert & oriented, confused when he woke up, but became more oriented later on    Dressing: None    Diet: Regular, appetite 100% for meals, drinking adequate amounts of fluids    LDA: Left hand PIV, saline locked    Equipment: IV pump, cell phone and     Plan: Will continue plan of care    Additional Info: MSSA 5 & 4. Had shower this AM. Bedside attendant discontinued

## 2021-04-29 ENCOUNTER — PATIENT OUTREACH (OUTPATIENT)
Dept: CARE COORDINATION | Facility: CLINIC | Age: 26
End: 2021-04-29

## 2021-04-29 VITALS
HEART RATE: 58 BPM | OXYGEN SATURATION: 99 % | DIASTOLIC BLOOD PRESSURE: 63 MMHG | TEMPERATURE: 97.2 F | WEIGHT: 171.7 LBS | RESPIRATION RATE: 16 BRPM | SYSTOLIC BLOOD PRESSURE: 119 MMHG

## 2021-04-29 PROCEDURE — 99231 SBSQ HOSP IP/OBS SF/LOW 25: CPT | Performed by: NURSE PRACTITIONER

## 2021-04-29 PROCEDURE — H0001 ALCOHOL AND/OR DRUG ASSESS: HCPCS

## 2021-04-29 PROCEDURE — 99207 PR CDG-CODE INCORRECT PER BILLING BASED ON TIME: CPT | Performed by: HOSPITALIST

## 2021-04-29 PROCEDURE — 99239 HOSP IP/OBS DSCHRG MGMT >30: CPT | Performed by: HOSPITALIST

## 2021-04-29 PROCEDURE — 250N000013 HC RX MED GY IP 250 OP 250 PS 637: Performed by: NURSE PRACTITIONER

## 2021-04-29 RX ORDER — GABAPENTIN 300 MG/1
300 CAPSULE ORAL 3 TIMES DAILY
Qty: 90 CAPSULE | Refills: 0 | OUTPATIENT
Start: 2021-04-29 | End: 2024-08-08

## 2021-04-29 RX ORDER — PHENOBARBITAL 32.4 MG/1
32.4 TABLET ORAL 2 TIMES DAILY
Qty: 2 TABLET | Refills: 0 | Status: SHIPPED | OUTPATIENT
Start: 2021-04-29

## 2021-04-29 RX ORDER — GABAPENTIN 300 MG/1
300 CAPSULE ORAL 3 TIMES DAILY
Qty: 90 CAPSULE | Refills: 0 | Status: SHIPPED | OUTPATIENT
Start: 2021-04-29 | End: 2021-05-29

## 2021-04-29 RX ORDER — GABAPENTIN 300 MG/1
300 CAPSULE ORAL 3 TIMES DAILY
Status: DISCONTINUED | OUTPATIENT
Start: 2021-04-29 | End: 2021-04-29 | Stop reason: HOSPADM

## 2021-04-29 RX ADMIN — GABAPENTIN 300 MG: 300 CAPSULE ORAL at 15:28

## 2021-04-29 RX ADMIN — PHENOBARBITAL 32.4 MG: 32.4 TABLET ORAL at 09:10

## 2021-04-29 NOTE — PROGRESS NOTES
Pt appropriate for discharge. Pt given instructions and medications. Pt able to say the times of when medication should be taken. Pt will have Outpatient treatment. Pt will discharge home and friend will  around 1730. Pt discharged in stable condition.

## 2021-04-29 NOTE — CONSULTS
4/29/2021      Pt has been interviewed via [employer] and CD Consult has been completed.     Recommendations:     Met with pt for chemical dependency evaluation. Pt reports he has not attended sober support meetings in the past. Pt reports he feels his chemical use is a problem. Pt reports he would be willing to attend outpatient CD treatment depending on what his insurance will cover. Pt is recommended to:    1. Abstain from all non-prescribed mood-altering substances  2. Take all medications as prescribed  3. Enter and complete an outpatient treatment program  4. Follow all recommendations upon discharge from treatment. Recommendations may include, sober support group attendance.         5.   Follow all recommendations of your medical providers    Referrals:    [facility] -  Co-Occurring IOP [place]  Phone: [phone]  Fax: [phone]  [facility]  Phone: [phone]  Fax: [phone]    [facility] Outpatient Admissions:   [address] R [address] [facility]   Phone: [phone]   Fax: [phone]        HUGO consult completed by: KAROL Singh  Phone Number: [phone]  E-mail Address: [email or web address] Mental [email or web address] and Addiction [email or web address] Evaluation Department  [address] [place] [address]

## 2021-04-29 NOTE — CONSULTS
Psychiatry Consultation; Follow up          Reason for Consult, requesting source:    F/u benzo withdrawal  Requesting source: Dr Gillette          Interim history:    Mr. Carmelo Coello is a 26-year-old male who was admitted to [facility] on 4/23/21 after presenting to the emergency department by a friend due to concern for psychosis related to benzodiazepine use and possible withdrawal. Psychiatry is consulted for management of his benzodiazepine withdrawal.     On evaluation today, Mr. Coello is doing well. His withdrawal symptoms have resolved. His thoughts are linear and logical and he is fully oriented. He is future-oriented. Endorses mild depressive symptoms but reports his depression has been better over the past few years. Reports his anxiety is his main problem, for which he had been trying to self-medicate with benzodiazepines. Discussed the option of an SSRI. Patient has tried multiple SSRIs in the past but unfortunately has experienced significant hyperhidrosis during each of those trials. He has not tried fluoxetine - discuss that this would carry risk of hyperhidrosis as well. Discussed gabapentin may be a good alternative for his anxiety and can also help to reduce cravings to use benzos/alcohol. Discussed r/b/adverse effects. Patient agreeable. He will be following up with outpatient psychiatry in mid May.          Medications:     Current Facility-Administered Medications   Medication     acetaminophen (TYLENOL) tablet 650 mg     lidocaine (LMX4) cream     lidocaine 1 % 0.1-1 mL     LORazepam (ATIVAN) tablet 1 mg     melatonin tablet 1 mg     OLANZapine zydis (zyPREXA) ODT tab 5 mg     ondansetron (ZOFRAN-ODT) ODT tab 4 mg    Or     ondansetron (ZOFRAN) injection 4 mg     PHENobarbital (LUMINAL) tablet 32.4 mg     sodium chloride (PF) 0.9% PF flush 3 mL     sodium chloride (PF) 0.9% PF flush 3 mL              MSE:     Appearance: age-appearing, seated in hospital bed, wearing glasses,  "adequate hygiene and grooming, in no acute distress  Behavior: cooperative, pleasant and calm  Orientation: alert and oriented to time, place and person  Movements: no abnormal or involuntary movements observed  Mood: \"pretty good\"  Affect: mood-congruent, stable, somewhat restricted  Speech: Normal rate, rhythm, tone  Memory: recall of recent events is mildly impaired; remote recall grossly intact  Intellectual capacity: Average for development based on word choice  Concentration: attentive to interview  Thought process and content: linear and coherent; no loosened associations; no delusions or paranoia endorsed or elicited; denies AH/VH.   Insight: fair  Judgement: fair  Safety: denies suicidal or homicidal ideation, plan, or intent    Vital signs:  Temp: 97.2  F (36.2  C) Temp src: Oral BP: 119/63 Pulse: 58   Resp: 16 SpO2: 99 % O2 Device: None (Room air)     Weight: 77.9 kg (171 lb 11.2 oz)  There is no height or weight on file to calculate BMI.              DSM-5 Diagnosis:   #1 Benzodiazepine use disorder, severe  #2 Benzodiazepine withdrawal, severe, complicated by auditory and visual hallucinations, resolved  #3 Encephalopathy secondary to benzodiazepine withdrawal, resolved  #4 Unspecified depressive disorder, by history  #5 Generalized anxiety disorder, by history          Assessment:   Mr. Carmelo Coello is a 26-year-old male who was admitted for management of benzodiazepine withdrawal after presenting to the emergency department with symptoms of psychosis and disorganized behavior.    Mr. Coello is doing much better than when he first arrived. His altered mental status has resolved. He tolerated the phenobarbital taper well, with some mild sedation initially. His vital signs have remained stable. Discussed treatment approach for his anxiety. He has trialed multiple SSRIs - unfortunately experienced fairly severe hyperhidrosis with SSRIs. Briefly discussed fluoxetine, but this would carry the same risk of " hyperhidrosis. Discussed alternative agent gabapentin for anxiety. Discussed mechanism of action, r/b/ae. He was agreeable to try this. He has f/u at [facility] in 2 weeks. Encouraged psychotherapy. Encouraged follow-up with CD counseling outpatient. He is safe to discharge from a psychiatric perspective.           Summary of Recommendations:   1) Recommend to start gabapentin 300 mg TID for anxiety and benzo abstinence  2) Patient has experienced fairly severe hyperhydrosis with SSRIs - will hold off on starting an SSRI at this time.   3) Patient has follow-up scheduled with KATHY Nath, CNP at [facility] in about 2 weeks. He was also encouraged to pursue psychotherapy through [facility].   4) Recommend outpatient chemical dependency counseling - was given resources by CD  5) Recommended to abstain from any non-prescribed mind or mood altering substances.  6) Page me with additional questions or concerns.     KATHY Johns CNP  [employer]   Contact information available via [facility] Paging/Directory

## 2021-04-29 NOTE — PLAN OF CARE
VS: VSS   O2: Room air sat. > 90%.   Output: Voiding adequate amount without difficulty.    Last BM: 04/27/21   Activity: Independent   Skin: Intact.   Pain: Denies    CMS: CMS and neuro intact.    Dressing: None.    Diet: Regular    LDA: PIV SL.    Equipment: IV pole and personal belongings.    Plan: TBD.    Additional Info: No confusion this shift.

## 2021-04-29 NOTE — PLAN OF CARE
VS: VSS, pt denied CP or SOB.   O2: Room air sat. > 90%.   Output: Voiding without difficulty.   Last BM: 04/28/21   Activity: Up independent in the room.    Skin: Intact.   Pain: Denied pain or discomfort.    CMS: CMS and neuro intact to baseline.    Dressing: None.    Diet: Regular tolerating okay.    LDA: None.    Equipment: IV pole.    Plan: Discharge home this evening.   Additional Info:         DISPLAY PLAN FREE TEXT

## 2021-04-29 NOTE — PROGRESS NOTES
2021        Type Of Assessment: Inpatient Substance Use Comprehensive Assessment    Referral Source:  [facility]   MRN: [id number]    DATE OF SERVICE: 2021  Date of previous HUGO Assessment: NA  Patient confirmed identity through two factor verification: Full Legal Name,  and SSN    PATIENT'S NAME: Carmelo Coello  Age: 26 year old  Last 4 SSN: [id number]  Sex: male   Gender Identity: male  Sexual Orientation: Heterosexual  Cultural Background: No, Denies any cultural influences or concerns that need to be considered for treatment  YOB: 1995  Current Address:   [address] [place], [place] [id number]  Patient Phone Number:  [phone]  Patient's E-Mail Contact:  [email or web address]  Funding: [facility]      Telemedicine Visit: The patient's condition can be safely assessed and treated via synchronous audio and visual telemedicine encounter.    Reason for Telemedicine Visit: Services only offered telehealth  Originating Site (Patient Location): [address] [address]     Distant Site (Provider Location): Provider Remote Setting  Consent:  The patient/guardian has verbally consented to: the potential risks and benefits of telemedicine (video visit) versus in person care; bill my insurance or make self-payment for services provided; and responsibility for payment of non-covered services.   Mode of Communication:  Video Conference via [employer]    START TIME: 1151 am  END TIME: 1221 pm    As the provider I attest to compliance with applicable laws and regulations related to telemedicine.      Carmelo Coello was seen for a substance use disorder consult on 2021 by KAROL Singh.      Reason for Substance Use Disorder Consult:  Per H&P    Carmelo Coello is a 25 year old male admitted on 2021. He has history of anxiety and depression, and was brought to the hospital by a friend due to concern for psychosis related to  benzodiazepine use and possible withdrawal.        Are you currently having severe withdrawal symptoms that are putting yourself or others in danger? Pt is currently hospitalized.   Are you currently having severe medical problems that require immediate attention? Pt is currently hospitalized.   Are you currently having severe emotional or behavioral problems that are putting yourself or others at risk of harm? Pt is currently hospitalized.     Have you participated in prior substance use disorder evaluations? No   Have you ever been to detox, inpatient or outpatient treatment for substance related use? List previous treatment: No   Have you ever had a gambling problem or had treatment for compulsive gambling? No  Patient does not appear to be in severe withdrawal, an imminent safety risk to self or others, or requiring immediate medical attention and may proceed with the assessment interview.    Comprehensive Substance Use History   X X = Primary Drug Used Age of First Use    Pattern of Substance Use   (heaviest use in life and a use history within the past year if applicable) (DSM-5: Sx #3) Date /  Quantity of last use if within the past 30 days Withdrawal Potential?   Method of use  (Oral, smoked, snorted, IV, etc)    Alcohol   18 Social use  On weekends 1-2 drinks at a time No Oral     Marijuana/Hashish   18 Past hx of weekly use, no use in 3 months       Cocaine/Crack 20  Used in lifetime       Meth/Amphetamines   In college Used adderall to study occasionally        Heroin   No use        Other Opiates/Synthetics   No use        Inhalants  No use        Benzodiazepines   19 Started as Rx .5 PRN No Rx for about a year, current use  3-4 times weekly 1-3 pills  Yes Oral    Hallucinogens   In college  1-2 times yearly        Barbiturates/Sedatives/Hypnotics   No use        Over-the-Counter Drugs   No use        Other   No use        Nicotine   19 Cigarettes  vape for last year No Vape     Withdrawal symptoms: Have  you had any of the following withdrawal symptoms?  Pt reports he can't recall physical symptoms  but gets anxiety.    Have you experienced any cravings?  Yes    Have you had periods of abstinence?  Yes  What was your longest period? Pt reports he's not used for a couple weeks at a time.     Any circumstances that lead to relapse? Pt reports he resumed using because he thought life was more enjoyable with substances.     What activities have you engaged in when using alcohol/other drugs that could be hazardous to you or others? (i.e. driving a car/motorcycle/boat, operating machinery, unsafe sex, sharing needles for drugs or tattoos, etc ) The patient denied engaging in any of the above dangerous activities when using alcohol and/or drugs.    A description of any risk-taking behavior, including behavior that puts the client at risk of exposure to blood-borne or sexually transmitted diseases: NA    Arrests and legal interventions related to substance use: NA    A description of how the patient's use affected their ability to function appropriately in a work setting: Pt reports no issues with work.     A description of how the patient's use affected their ability to function appropriately in an educational setting: Pt reports his school work was not effected.     Leisure time activities that are associated with substance use:  Pt reports if he's not using he might not go to social events due to rebound anxiety.    Do you think your substance use has become a problem for you? He agrees he has a substance abuse problem.    MEDICAL HISTORY  Physical or medical concerns or diagnoses: Per H&P    Benzodiazepine use disorder  Acute psychosis due to benzodiazepine use versus withdrawal versus underlying psychiatric condition     Past Medical History:   Diagnosis Date     Anxiety      Do you have any current medical treatment needs not being addressed by inpatient treatment?  Pt is currently hospitalized.     Do you need a  referral for a medical provider? NA    Current medications: Per EHR    Current Facility-Administered Medications   Medication     acetaminophen (TYLENOL) tablet 650 mg     lidocaine (LMX4) cream     lidocaine 1 % 0.1-1 mL     LORazepam (ATIVAN) tablet 1 mg     melatonin tablet 1 mg     OLANZapine zydis (zyPREXA) ODT tab 5 mg     ondansetron (ZOFRAN-ODT) ODT tab 4 mg    Or     ondansetron (ZOFRAN) injection 4 mg     PHENobarbital (LUMINAL) tablet 32.4 mg     sodium chloride (PF) 0.9% PF flush 3 mL     sodium chloride (PF) 0.9% PF flush 3 mL         Are you pregnant? NA, Male    Do you have any specific physical needs/accommodations? No    MENTAL HEALTH HISTORY:  Have you ever had  hospitalizations or treatment for mental health illness: No    Mental health history, including diagnosis and symptoms, and the effect on the client's ability to function: Pt reports anxiety, social anxiety and depression.    Current mental health treatment including psychotropic medication needed to maintain stability: (Note: The assessment must utilize screening tools approved by the commissioner pursuant to section 245.4863 to identify whether the client screens positive for co-occurring disorders): Pt reports no current  med's or therapy. Pt reports he has tried med's but didn't like the side effects.    GAIN-SS Tool:    When was the last time that you had significant problems... 4/29/2021   with feeling very trapped, lonely, sad, blue, depressed or hopeless about the future? Past month   with sleep trouble, such as bad dreams, sleeping restlessly, or falling asleep during the day? Past Month   with feeling very anxious, nervous, tense, scared, panicked or like something bad was going to happen? Past month   with becoming very distressed & upset when something reminded you of the past? 1+ years ago   with thinking about ending your life or committing suicide? 1+ years ago     When was the last time that you did the following things  2 or more times? 4/29/2021   Lied or conned to get things you wanted or to avoid having to do something? Never   Had a hard time paying attention at school, work or home? Never   Had a hard time listening to instructions at school, work or home? Never   Were a bully or threatened other people? Never   Started physical fights with other people? Never       Have you ever been verbally, emotionally, physically or sexually abused?   The patient denied having any history of being verbally, emotionally, physically or sexually abused.    Family history of substance use and misuse: Pt reports he's not sure about his bio dad. Pt reports his  step father had a previous opiate addiction.     The patient's desire for family involvement in the treatment program: Pt reports his mother would be involved but he's not sure if he wants family involvement.   Level of family support: Mother    Social network in relation to expected support for recovery: Pt reports he's had no AA or NA attendance. Pt reports he has friends that are social drinkers.    Are you currently in a significant relationship? No    Do you have any children (include living arrangements/custody/contact)?:  No    What is your current living situation? Pt reports he leases a house with 3 other roommates.     Are you employed/attending school? Pt reports he has been working as a . Pt reports he has a degree in Psychology but doesn't know what he wants to do with it yet.      SUMMARY:  Ability to understand written treatment materials: Yes  Ability to understand patient rules and patient rights: Yes  Does the patient recognize needs related to substance use and is willing to follow treatment recommendations: Yes  Does the patient have an opioid use disorder:  does not have a history of opiate use.    ASAM Dimension Scale Ratings:  Dimension 1: 0 Client displays full functioning with good ability to tolerate and cope with withdrawal discomfort. No signs  or symptoms of intoxication or withdrawal or resolving signs or symptoms.  Dimension 2: 1 Client tolerates and alma with physical discomfort and is able to get the services that the client needs.  Dimension 3: 1 Client has impulse control and coping skills. Client presents a mild to moderate risk of harm to self or others or displays symptoms of emotional, behavioral or cognitive problems. Client has a mental health diagnosis and is stable. Client functions adequately in significant life areas.  Dimension 4: 2 Client displays verbal compliance, but lacks consistent behaviors; has low motivation for change; and is passively involved in treatment.  Dimension 5: 3 Client has poor recognition and understanding of relapse and recidivism issues and displays moderately high vulnerability for further substance use or mental health problems. Client has few coping skills and rarely applies coping skills.  Dimension 6: 2 Client is engaged in structured, meaningful activity, but peers, family, significant other, and living environment are unsupportive, or there is criminal justice involvement by the client or among the client's peers, significant others, or in the client's living environment.    Category of Substance Severity (ICD-10 Code / DSM 5 Code)     Alcohol Use Disorder The patient does not meet the criteria for an Alcohol use disorder.   Cannabis Use Disorder The patient does not meet the criteria for a Cannabis use disorder.   Hallucinogen Use Disorder The patient does not meet the criteria for a Hallucinogen use disorder.   Inhalant Use Disorder The patient does not meet the criteria for an Inhalant use disorder.   Opioid Use Disorder The patient does not meet the criteria for an Opioid use disorder.   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   Tobacco Use Disorder The patient does not  meet the criteria for a Tobacco use disorder.   Other (or unknown) Substance Use Disorder The patient does not meet the criteria for a Other (or unknown) Substance use disorder.     A problematic pattern of alcohol/drug use leading to clinically significant impairment or distress, as manifested by at least two of the following, occurring within a 12-month period:    2.) There is a persistent desire or unsuccessful efforts to cut down or control alcohol/drug use  4.) Craving, or a strong desire or urge to use alcohol/drug  9.) Alcohol/drug use is continued despite knowledge of having a persistent or recurrent physical or psychological problem that is likely to have been caused or exacerbated by alcohol.  10.) Tolerance, as defined by either of the following: A need for markedly increased amounts of alcohol/drug to achieve intoxication or desired effect.  11.) Withdrawal, as manifested by either of the following: The characteristic withdrawal syndrome for alcohol/drug (refer to Criteria A and B of the criteria set for alcohol/drug withdrawal).    Specify if: In early remission:  After full criteria for alcohol/drug use disorder were previously met, none of the criteria for alcohol/drug use disorder have been met for at least 3 months but for less than 12 months (with the exception that Criterion A4,  Craving or a strong desire or urge to use alcohol/drug  may be met).     In sustained remission:   After full criteria for alcohol use disorder were previously met, non of the criteria for alcohol/drug use disorder have been met at any time during a period of 12 months or longer (with the exception that Criterion A4,  Craving or strong desire or urge to use alcohol/drug  may be met).     Specify if:   This additional specifier is used if the individual is in an environment where access to alcohol is restricted.    Mild: Presence of 2-3 symptoms  Moderate: Presence of 4-5 symptoms  Severe: Presence of 6 or more  symptoms    Collateral information: HUGO Collateral Info: Sufficient information is obtained from the patient to support diagnosis and recommendations. Contact with a collateral sources is not required.    Recommendations:     Met with pt for chemical dependency evaluation. Pt reports he has not attended sober support meetings in the past. Pt reports he feels his chemical use is a problem. Pt reports he would be willing to attend outpatient CD treatment depending on what his insurance will cover. Pt is recommended to:    1. Abstain from all non-prescribed mood-altering substances  2. Take all medications as prescribed  3. Enter and complete an outpatient treatment program  4. Follow all recommendations upon discharge from treatment. Recommendations may include, sober support group attendance.         5.   Follow all recommendations of your medical providers    Referrals:    [facility] -  Co-Occurring IOP [place]  Phone: [phone]  Fax: [phone]  [facility] Deleon [facility]  Phone: [phone]  Fax: [phone]    [facility] Outpatient Admissions:   [address] [facility]   Phone: [phone]   Fax: [phone]        HUGO consult completed by: KAROL Singh  Phone Number: [phone]  E-mail Address: [email or web address] Mental [email or web address] and Addiction [email or web address] Evaluation Department  [address] [place] [address]

## 2021-04-29 NOTE — PROGRESS NOTES
Care Management Follow Up    Length of Stay (days): 6    Expected Discharge Date: 04/30/21     Concerns to be Addressed:     Discharge planning, CD resources  Patient plan of care discussed at interdisciplinary rounds: Yes    Anticipated Discharge Disposition:  Home     Anticipated Discharge Services:  CD resources   Anticipated Discharge DME:  N/A    Patient/family educated on Medicare website which has current facility and service quality ratings:  No - [facility] only  Education Provided on the Discharge Plan:  Yes  Patient/Family in Agreement with the Plan:  Yes    Referrals Placed by CM/SW:  N/A  Private pay costs discussed: Not applicable    Additional Information:  SW rec'd email from CD  Nnamdi who relayed that she did the pt's assessment today and is recommending outpatient treatment - she sent referrals to [facility] and [facility]. She requested that SW have the pt sign ROIs for the facilities.      SW met with pt at bedside - SW introduced herself and explained the role of SW in discharge planning.  SW had pt sign ROIs for the outpatient programs (SW placed in pt's physical chart).  Pt relayed that he is planning to discharge today and his room mate is picking him up.  Pt denied having other SW needs.       KARLEE Evans  [facility]  [facility] [address] [facility]   Ph: [phone]  Pager: [phone]       Lon Garcia  : 1954  Primary: [facility]  Secondary:  [address]  [facility] [address] [facility] [address], [facility] [address]  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 5 = High Risk)  MD Orders: Referral to occupational therapy MEDICAL/REFERRING DIAGNOSIS:   Cerebral infarction, unspecified [I63.9]  Weakness [R53.1]   DATE OF ONSET: 2017   REFERRING PHYSICIAN: Juliette Eaton*  RETURN PHYSICIAN APPOINTMENT: unknown   3/5/18: Ms. Ambar Jon continues to make excellent progress towards her therapy goals; she demonstrates gradual improvements in R UE range of motion, specifically R shoulder flexion and R elbow extension/flexion, and reports increased functional use of R UE in activities of daily living, including dressing, grooming, and feeding. Patient would continue to benefit from skilled occupational therapy services to maximize safety and independence and increase functional use of R UE during activities of daily living.    18: Ms. Ambar Jon is making excellent progress toward her goals; she is demonstrating improving range of motion and functional use of the right upper extremity in activities of daily living. Feel she will continue to benefit from skilled occupational therapy to maximize safety and independence with activities of daily living. INITIAL ASSESSMENT:  Ms. Ambar Jon presents with impaired active movement, strength, coordination and sensation of the dominant right upper extremity that is affecting her ability to complete activities of daily living independently.  Feel she may benefit from skilled occupational therapy to maximize safety and independence with activities of daily living. PLAN OF CARE:   PROBLEM LIST:  1. Decreased Strength  2. Decreased ADL/Functional Activities  3. Decreased Transfer Abilities  4. Decreased Balance  5. Decreased Flexibility/Joint Mobility  6. Decreased Cognition INTERVENTIONS PLANNED:  1. Activities of daily living training  2. Manual therapy training  3. Modalities  4. Neuromuscular re-eduation  5. Sensory reintegration training  6. Splinting  7. Therapeutic activity  8. Therapeutic exercise   TREATMENT PLAN:  Effective Dates: 3/19/18 to 6/17/18. Frequency/Duration: 2 times a week for 12 weeks  GOALS: (Goals have been discussed and agreed upon with patient.)  Short-Term Functional Goals: Time Frame: 6 weeks  1. Patient will demonstrate independence with home exercise program for right upper extremity range of motion. Met  2. Patient will increase use of right upper extremity as a functional assist in at least 25% of daily activities. Met  3. Patient will bathe with moderate assistance. Continue to address  Discharge Goals: Time Frame: 12 weeks  1. Patient will bathe with minimal assistance. Continue to address  2. Patient will feed self with modified independence and adaptive equipment as needed. Continue to address  3. Patient will dress with modified independence and adaptive equipment as needed. Continue to address  4. Patient will use right upper extremity as a functional assist in at least 50% of daily activities. Continue to address  Rehabilitation Potential For Stated Goals: Good  Regarding Lawanda Iraheta's therapy, I certify that the treatment plan above will be carried out by a therapist or under their direction. Thank you for this referral,  Francisco Larios, OT     Referring Physician Signature: Juliette Higgins* _________________________  Date _________            The information in this section was collected on 3/5/18 (except where otherwise noted).   OCCUPATIONAL PROFILE & HISTORY:   History of Present Injury/Illness (Reason for Referral):  CVA with hospital and [facility] stay then home health therapy. Past Medical History/Comorbidities:   Ms. Tracey Yo  has a past medical history of Anxiety; CVA (cerebral vascular accident) ([facility] [address] (2017); Depression; HTN (hypertension); Menopause; and PTE (pulmonary thromboembolism) ([facility] [address] (2017). Ms. Tracey Yo  has a past surgical history that includes hx jennifer and bso (); hx  section; hx refractive surgery (); hx other surgical (); and hx other surgical (2017). Social History/Living Environment:      Prior Level of Function/Work/Activity:  Does seasonal taxes at [employer]  Dominant Side:         RIGHT  Current Medications:    Current Outpatient Prescriptions:     polyethylene glycol (MIRALAX) 17 gram/dose powder, Take 17 g by mouth daily. , Disp: 510 g, Rfl: 2    [START ON 2018] methylphenidate HCl (RITALIN) 20 mg tablet, Take 1 Tab (20 mg total) by mouth daily. Max Daily Amount: 20 mg, Disp: 30 Tab, Rfl: 0    [START ON 2018] methylphenidate HCl (RITALIN) 20 mg tablet, Take 1 Tab (20 mg total) by mouth daily. Max Daily Amount: 20 mg, Disp: 30 Tab, Rfl: 0    methylphenidate HCl (RITALIN) 20 mg tablet, Take 1 Tab (20 mg total) by mouth daily. Max Daily Amount: 20 mg, Disp: 30 Tab, Rfl: 0    rOPINIRole (REQUIP) 2 mg tablet, Take 1 Tab by mouth nightly. for restless leg, Disp: 90 Tab, Rfl: 1    FLUoxetine (PROZAC) 20 mg tablet, Take 2 Tabs by mouth daily. , Disp: 180 Tab, Rfl: 1    tiZANidine (ZANAFLEX) 4 mg tablet, 4mg po TID, Disp: 270 Tab, Rfl: 1    labetalol (NORMODYNE) 200 mg tablet, Take 1 Tab by mouth two (2) times a day., Disp: 180 Tab, Rfl: 1    losartan-hydroCHLOROthiazide (HYZAAR) 100-12.5 mg per tablet, Take 1 Tab by mouth daily. , Disp: 90 Tab, Rfl: 1    ALPRAZolam (XANAX) 0.25 mg tablet, Take 1 Tab by mouth three (3) times daily as needed for Anxiety.  Max Daily Amount: 0.75 mg., Disp: 30 Tab, Rfl: 1            Date Last Reviewed:  2018   Complexity Level : Expanded review of therapy/medical records (1-2):  MODERATE COMPLEXITY   ASSESSMENT OF OCCUPATIONAL PERFORMANCE:   ROM:                   Balance:                   Coordination:                   Mental Status:                   Vision:                   Activities of Daily Living:           Basic ADLs (From Assessment) Complex ADLs (From Assessment)         Grooming/Bathing/Dressing Activities of Daily Living                                   Sensation:         Light touch absent distal to right elbow     Physical Skills Involved:  1. Range of Motion  2. Balance  3. Sensation  4. Fine Motor Control  5. Gross Motor Control Cognitive Skills Affected (resulting in the inability to perform in a timely and safe manner):  1. Executive Function  2. Sustained Attention  3. Divided Attention  4. Comprehension Psychosocial Skills Affected:  1. None   Number of elements that affect the Plan of Care: 5+:  HIGH COMPLEXITY   CLINICAL DECISION MAKING:   Outcome Measure: Tool Used: [address] [facility] [address] [address] AM-[facility] Daily Activity Outpatient Short Form  Score:  Initial: 18 Most Recent: 33 (Date: 3/5/18 )   Interpretation of Tool:  Represents clinically-significant functional categories (i.e., basic and instrumental activities of daily living, fine motor activities). Score 60 59-55 54-47 46-34 32-21 20-16 15   Modifier CH CI CJ CK CL CM CN     Medical Necessity:   · Patient demonstrates good rehab potential due to higher previous functional level. Reason for Services/Other Comments:  · Patient continues to require skilled intervention due to decreased safety and independence in activities of daily living. Clinical Decision-Making Assessment: Moderately difficult to predict patient's progress at this time due to the extent of her limitations in functional movement and use of dominant right upper extremity.     Assessment process, impact of co-morbidities, assessment modification\need for assistance, and selection of interventions: Analytical Complexity:MODERATE COMPLEXITY   TREATMENT:   (In addition to Assessment/Re-Assessment sessions the following treatments were rendered)    Pre-treatment Symptoms/Complaints:  Patient states, \"My Botox is about 2 weeks away. \"  Pain: Initial:   Pain Intensity 1: 0/10 Post Session:  0/10     Therapeutic Exercise: ( 35 minutes):  Exercises per grid below to improve dynamic movement of arm - right and hand - right to improve functional lifting, carrying, reaching and grasping. Required moderate visual and verbal cues to promote proper body mechanics. Progressed range, repetitions and complexity of movement as indicated.        Date:  3/7/18 Date:  3/12/18 Date:  3/14/18 Date:  3/19/18 Date:   3/21/18 Date:  3/26/18 Date:  3/28/18 Date:  4/4/18   Activity/Exercise           Shoulder shrugs AROM x 10 reps AROM x 10 reps AROM x 10 reps with mirror for visual feedback AROM x 10 reps with mirror for visual feedback AROM x 10 reps with mirror for visual feedback AROM x 10 reps with mirror for visual feedback AROM x 10 reps with mirror for visual feedback AROM x 10 reps with mirror for visual feedback   Scapular protraction/retraction AROM   1 x 10 with passive self range into elbow extension at end range  1 x 10 with green theratubing  1 x 10 with blue theratubing AROM  1 x 10 with blue theratubing AROM  1 x 10 with passive self range into elbow extension at end range AROM  1 x 10 with passive self range into elbow extension at end range AROM  1 x 10 with passive self range into elbow extension at end range    2 x 10 reps with blue theratubing AROM  1 x 10 with passive self range into elbow extension at end range    2 x 10 reps with blue theratubing AROM  1 x 10 with passive self range into elbow extension at end range AROM  1 x 10 with passive self range into elbow extension at end range    2 x 10 reps with blue theratubing   Shoulder abduction           Elbow flexion/extension           PNF D1/D2 diagonals           Hand helper  15 lbs x 5 reps with min assist to extend fingers   20 lbs x 5 reps with assist to extend fingers   20 lbs x 5 reps with assist to extend fingers   Tabletop skateboard           Shoulder flexion/extension           Chest press           UBE Min assist to sustain right  on handle   Level 0  6 mintues Min assist to sustain right  on handle   Level 1  5 mintues Min assist to sustain right  on handle   Level 0  6 mintues Min assist to sustain right  on handle   Level 0  6 mintues Min assist to sustain right  on handle   Level 0  6 mintues Min assist to sustain right  on handle   Level 0  7 mintues Min assist to sustain right  on handle   Level 0  6 mintues Min assist to sustain right  on handle   Level 0  6 mintues   Modified push-ups  Hands on countertop with dycem under right hand  3 x 10  Hands on countertop with dycem under right hand  2 x 10 Hands on countertop with dycem under right hand  2 x 10 Hands on countertop with dycem under right hand  3 x 10 Hands on countertop with dycem under right hand  3 x 10 Hands on countertop with dycem under right hand  3 x 10 Hands on countertop with dycem under right hand  3 x 10   Resistive clothespin Yellow   1 x 5 pinch and release Yellow   1 x 5 pinch and release Yellow   1 x 5 pinch and release   Yellow   1 x 5 pinch and release  Yellow   1 x 5 pinch and release   Shoulder arc  10 tabs on small hill; Left hand assisted R hand grasping and releasing small tabs; used R arm independently to pull tabs over  10 tabs on small hill; Left hand assisted R hand grasping and releasing small tabs; used R arm independently to pull tabs over   10 tabs on small hill; Left hand assisted R hand grasping and releasing small tabs; used R arm independently to pull tabs over    Wrist flexion/extension           Punching bag               Access Code: [email or web address]   URL: [email or web address]   Date: 01/19/2018   Prepared by: Jasmyne Grewal     Exercises   Supine Shoulder Flexion PROM - 10 reps - 2 sets - 3 hold - 1x daily - 5x weekly   Push-Up on Counter - 10 reps - 2 sets - 1x daily - 5x weekly   Seated Shoulder Shrugs - 10 reps - 2 sets - 1x daily - 5x weekly   Seated Scapular Retraction - 10 reps - 2 sets - 1x daily - 5x weekly   Seated Weight Shifting with Arm Support - 10 reps - 2 sets - 1x daily - 5x weekly   Seated Shoulder Flexion Self PROM - 10 reps - 2 sets - 1x daily - 5x weekly   Supine Elbow Flexion Extension AROM - 10 reps - 2 sets - 1x daily - 5x weekly   Seated Elbow Flexion Extension AROM - 10 reps - 2 sets - 1x daily - 5x weekly     Neuromuscular Re-education: ( 10):  Exercise/activities per grid below to improve balance, coordination, kinesthetic sense and proprioception. Required moderate visual and verbal cues to promote static balance in sitting, promote coordination of right, upper extremity(s) and promote motor control of right, upper extremity(s). Patient completed lateral weight shifts sitting at edge of mat x 20 reps each onto elbow/forearms  with dynamic reach with left hand outside of base of support, using right upper extremity to maintain balance. Treatment/Session Assessment: Pt demo'd increase flexor tone in R hand today, resulting in decreased ability to relax during hand gripping exercises. However, pt continues demo good lateral and forward weight shifts outside base of support. Pt would continue to benefit from skilled OT services to increase functional use of R UE and increase independence with BADLs. · Response to Treatment: Mrs. Checo Reddy tolerated therapy well today, without any issue. She continues to be very cooperative and motivated during therapy. · Compliance with Program/Exercises: Will assess as treatment progresses. Pt reports compliance with HEP. · Recommendations/Intent for next treatment session:  \"Next visit will focus on advancements to more challenging activities and reduction in assistance provided\".     Total Treatment Duration:       Glenn Bach, OT

## 2021-04-29 NOTE — DISCHARGE SUMMARY
[facility]  Discharge Summary    Carmelo Coelol MRN# [id number]   YOB: 1995 Age: 26 year old     Date of Admission:  4/23/2021  Date of Discharge:  04/29/21.  Admitting Physician:  Silas Ramos MD  Discharge Physician:  Bran Gillette MD  Discharging Service:  Internal Medicine     Primary Provider: No Ref-Primary, Physician              Discharge Diagnosis:     Primary Discharge Diagnosis:    Benzodiazepine use disorder ( synthetic)  Acute psychosis due to benzodiazepine withdrawal state   Acute encephalopathy, metabolic likely due to withdrawal, resolved.    Low Grade Fever, Resolved. Etiology likely from withdrawal state.   No sign of infection ( UA negative ,wbc normal )      Past Medical History:   Diagnosis Date     Anxiety                 Discharge Medications:     Current Discharge Medication List      START taking these medications    Details   PHENobarbital (LUMINAL) 32.4 MG tablet Take 1 tablet (32.4 mg) by mouth 2 times daily  Qty: 2 tablet, Refills: 0    Associated Diagnoses: Benzodiazepine-induced psychosis, with delusions (H)                Hospital Course:     Carmelo Coello is a 25 year old male admitted on 4/23/2021. He has history of anxiety and depression, and was brought to the hospital by a friend due to concern for psychosis related to benzodiazepine use and possible withdrawal.      Benzodiazepine use disorder ( synthetic)  Acute psychosis due to benzodiazepine use versus withdrawal versus underlying psychiatric condition   Acute encephalopathy likely due to medications      Patient with severe visual and auditory hallucinations. He is being followed by psychiatry team, who has initially started on scheduled Lorazepam TID and Gabapentin.    However, given hiss trajectory, both these meds are stopped and patient is initiated on Phenobarbitone ( ordered by psych). He got IVF and he received supportive care.     Withdrawal has gotten clinically better  and now resolved. Subsequently 72 hr hold and sitter discontinued.   Hallucinations resolved. Confusion resolved.     He will finish phenobarbital taper tomorrow.    Seey by psychiatry and he is started on  gabapentin 300 mg TID for anxiety and benzo abstinence.  Patient has experienced fairly severe hyperhydrosis with SSRIs - will hold off on starting an SSRI at this time. Patient has follow-up scheduled with KATHY Nath CNP at [facility] in about 2 weeks. He was also encouraged to pursue psychotherapy through [facility].  Recommend outpatient chemical dependency counseling - was given resources by CD. Recommended to abstain from any non-prescribed mind or mood altering substances.    Low Grade Fever   No sign of infection ( UA negative ,wbc normal )  NO recurrence.       Diet: Regular Diet Adult    DVT Prophylaxis: mechanical . ambulating   Haney Catheter: not present  Code Status: Full Code             Discharge Disposition:   Discharged to home           Condition on Discharge:   Discharge condition: Stable   Code status on discharge: Full Code           Procedures:   None         Consultations:   Consultation during this admission received from Psychiatry.               Final Day of Progress before Discharge:       Physical Exam:  Blood pressure 119/63, pulse 58, temperature 97.2  F (36.2  C), temperature source Oral, resp. rate 16, weight 77.9 kg (171 lb 11.2 oz), SpO2 99 %.  GENERAL: Alert and oriented x 3. NAD.   HEENT: Anicteric sclera. PERRL. Mucous membranes moist and without lesions.   CV: RRR. S1, S2. No murmurs appreciated.   RESPIRATORY: Effort normal. Lungs CTAB with no wheezing, rales, rhonchi.   GI: Abdomen soft and non distended with normoactive bowel sounds present in all quadrants. No tenderness, rebound, guarding.      Data:  All laboratory data reviewed             Significant Results:     Results for orders placed or performed during the hospital encounter of 04/23/21    CBC with platelets differential     Status: None   Result Value Ref Range    WBC 6.6 4.0 - 11.0 10e9/L    RBC Count 4.59 4.4 - 5.9 10e12/L    Hemoglobin 14.8 13.3 - 17.7 g/dL    Hematocrit 42.8 40.0 - 53.0 %    MCV 93 78 - 100 fl    MCH 32.2 26.5 - 33.0 pg    MCHC 34.6 31.5 - 36.5 g/dL    RDW 11.6 10.0 - 15.0 %    Platelet Count 239 150 - 450 10e9/L    Diff Method Automated Method     % Neutrophils 61.0 %    % Lymphocytes 27.1 %    % Monocytes 10.7 %    % Eosinophils 0.3 %    % Basophils 0.6 %    % Immature Granulocytes 0.3 %    Nucleated RBCs 0 0 /100    Absolute Neutrophil 4.0 1.6 - 8.3 10e9/L    Absolute Lymphocytes 1.8 0.8 - 5.3 10e9/L    Absolute Monocytes 0.7 0.0 - 1.3 10e9/L    Absolute Eosinophils 0.0 0.0 - 0.7 10e9/L    Absolute Basophils 0.0 0.0 - 0.2 10e9/L    Abs Immature Granulocytes 0.0 0 - 0.4 10e9/L    Absolute Nucleated RBC 0.0    Comprehensive metabolic panel     Status: Abnormal   Result Value Ref Range    Sodium 141 133 - 144 mmol/L    Potassium 3.9 3.4 - 5.3 mmol/L    Chloride 104 94 - 109 mmol/L    Carbon Dioxide 29 20 - 32 mmol/L    Anion Gap 8 3 - 14 mmol/L    Glucose 104 (H) 70 - 99 mg/dL    Urea Nitrogen 17 7 - 30 mg/dL    Creatinine 1.13 0.66 - 1.25 mg/dL    GFR Estimate 89 >60 mL/min/[1.73_m2]    GFR Estimate If Black >90 >60 mL/min/[1.73_m2]    Calcium 9.2 8.5 - 10.1 mg/dL    Bilirubin Total 0.5 0.2 - 1.3 mg/dL    Albumin 4.2 3.4 - 5.0 g/dL    Protein Total 7.5 6.8 - 8.8 g/dL    Alkaline Phosphatase 59 40 - 150 U/L    ALT 18 0 - 70 U/L    AST 12 0 - 45 U/L   UA reflex to Microscopic and Culture     Status: None    Specimen: Urine Midstream; Midstream Urine   Result Value Ref Range    Color Urine Yellow     Appearance Urine Clear     Glucose Urine Negative NEG^Negative mg/dL    Bilirubin Urine Negative NEG^Negative    Ketones Urine Negative NEG^Negative mg/dL    Specific Gravity Urine 1.018 1.003 - 1.035    Blood Urine Negative NEG^Negative    pH Urine 6.0 5.0 - 7.0 pH    Protein  Albumin Urine Negative NEG^Negative mg/dL    Urobilinogen mg/dL Normal 0.0 - 2.0 mg/dL    Nitrite Urine Negative NEG^Negative    Leukocyte Esterase Urine Negative NEG^Negative    Source Midstream Urine    Drug abuse screen 6 urine (chem dep)     Status: Abnormal   Result Value Ref Range    Amphetamine Qual Urine Negative NEG^Negative    Barbiturates Qual Urine Negative NEG^Negative    Benzodiazepine Qual Urine Positive (A) NEG^Negative    Cannabinoids Qual Urine Negative NEG^Negative    Cocaine Qual Urine Negative NEG^Negative    Ethanol Qual Urine Negative NEG^Negative    Opiates Qualitative Urine Negative NEG^Negative   Asymptomatic SARS-CoV-2 COVID-19 Virus (Coronavirus) by PCR     Status: None    Specimen: Nasopharyngeal   Result Value Ref Range    SARS-CoV-2 Virus Specimen Source Nasopharyngeal     SARS-CoV-2 PCR Result NEGATIVE     SARS-CoV-2 PCR Comment (Note)    CBC with platelets     Status: None   Result Value Ref Range    WBC 7.3 4.0 - 11.0 10e9/L    RBC Count 4.57 4.4 - 5.9 10e12/L    Hemoglobin 14.8 13.3 - 17.7 g/dL    Hematocrit 43.2 40.0 - 53.0 %    MCV 95 78 - 100 fl    MCH 32.4 26.5 - 33.0 pg    MCHC 34.3 31.5 - 36.5 g/dL    RDW 11.3 10.0 - 15.0 %    Platelet Count 235 150 - 450 10e9/L   Comprehensive metabolic panel     Status: Abnormal   Result Value Ref Range    Sodium 142 133 - 144 mmol/L    Potassium 3.6 3.4 - 5.3 mmol/L    Chloride 108 94 - 109 mmol/L    Carbon Dioxide 27 20 - 32 mmol/L    Anion Gap 7 3 - 14 mmol/L    Glucose 106 (H) 70 - 99 mg/dL    Urea Nitrogen 13 7 - 30 mg/dL    Creatinine 0.96 0.66 - 1.25 mg/dL    GFR Estimate >90 >60 mL/min/[1.73_m2]    GFR Estimate If Black >90 >60 mL/min/[1.73_m2]    Calcium 8.6 8.5 - 10.1 mg/dL    Bilirubin Total 0.5 0.2 - 1.3 mg/dL    Albumin 3.9 3.4 - 5.0 g/dL    Protein Total 7.0 6.8 - 8.8 g/dL    Alkaline Phosphatase 54 40 - 150 U/L    ALT 15 0 - 70 U/L    AST 8 0 - 45 U/L   CBC with platelets     Status: None   Result Value Ref Range    WBC 9.2  4.0 - 11.0 10e9/L    RBC Count 4.65 4.4 - 5.9 10e12/L    Hemoglobin 15.0 13.3 - 17.7 g/dL    Hematocrit 44.2 40.0 - 53.0 %    MCV 95 78 - 100 fl    MCH 32.3 26.5 - 33.0 pg    MCHC 33.9 31.5 - 36.5 g/dL    RDW 11.7 10.0 - 15.0 %    Platelet Count 238 150 - 450 10e9/L   Comprehensive metabolic panel     Status: None   Result Value Ref Range    Sodium 142 133 - 144 mmol/L    Potassium 3.9 3.4 - 5.3 mmol/L    Chloride 107 94 - 109 mmol/L    Carbon Dioxide 29 20 - 32 mmol/L    Anion Gap 6 3 - 14 mmol/L    Glucose 96 70 - 99 mg/dL    Urea Nitrogen 14 7 - 30 mg/dL    Creatinine 1.08 0.66 - 1.25 mg/dL    GFR Estimate >90 >60 mL/min/[1.73_m2]    GFR Estimate If Black >90 >60 mL/min/[1.73_m2]    Calcium 9.1 8.5 - 10.1 mg/dL    Bilirubin Total 0.6 0.2 - 1.3 mg/dL    Albumin 4.3 3.4 - 5.0 g/dL    Protein Total 7.7 6.8 - 8.8 g/dL    Alkaline Phosphatase 59 40 - 150 U/L    ALT 17 0 - 70 U/L    AST 9 0 - 45 U/L   EKG 12 lead     Status: None   Result Value Ref Range    Interpretation ECG Click View Image link to view waveform and result    Psychiatry IP Consult: Psychosis in the setting of benzodiazepine use and possible withdrawal; Consultant may enter orders: Yes; Patient to be seen: Routine; Call back #: Please page hospitalist on call; Requesting provider? Hospitalist (if differ...     Status: None ()    Maryana Barnett APRN CNP     4/25/2021  9:07 AM  Psychiatry consult completed. Patient had difficulty   participating in interview. He would close eyes, and have facial   twitching and REM, lids closed.  With repeat questions he would   awaken and then be a bit startled then go back to eyes closed and   twitching. Attendant at bedside did not think he has been   ambulating. Answers to questions were minimal and nonsensical. He   is not eating, or no answer at all. No rigidity noted. Unknown if   he is taking fluids but his mouth appears dry with thick saliva.    I see he has been getting what he has called  flubromazolam and   other BZD like substances on the internet, and has tried to cut   back. Notes from [facility] (12/20) also indicate he uses Kratom. Hx   of Klonopin Rx. UDS positive for BZD.  He does use alcohol,   unknown amount.  Last MSSA was 7, scoring for tremor. There is   also concern for underlying psychosis that may need to be worked   up (brain imaging, labs, etc) , but right now he appears to be in   complex detox, likely from multiple agents some of which may not   show up on UDS. He is at risk of seizure.     BP (!) 149/70 (BP Location: Left arm)   Pulse 98   Temp 97.9  F   (36.6  C) (Axillary)   Resp 18   Wt 77.9 kg (171 lb 11.2 oz)     SpO2 95%       Case discussed with Dr Tena and Vandana REA.    Plan:  1. Full note to follow  2. Seizure precautions/seizure pads-ordered  3. Start gabapentin 600 mg TID-ordered  4. Cont 72 hr hold.  -re-eval when detoxed.   5.  Cont 1:1- pt safety and confusion, on hold  6. Continue MSSA. May need to use some BZD to cover detox if   scores start ramping up. None ordered now.  7. IV access in place.  May need emergency meds if he develops   seizures.  May need IV fluids if he continues with poor PO   intake. Risk of dehydration.   8.  If he does not start to clear may need imaging studies and   neuro consult.     Please call with any question [phone]      [facility], [place]   Initial Psychiatric Consult   Consult date: April 24, 2021         Reason for Consult, requesting source:    Withdrawal, psychosis BZD use.  Requesting source: Mago Myers        HPI:   Admitted 4/23/21  Per H&P: Carmelo Coello is a 25 year old male admitted on   4/23/2021. He has history of anxiety and depression, and was   brought to the hospital by a friend due to concern for psychosis   related to benzodiazepine use and possible withdrawal.     Notes from [facility] indicate Kratom use, as well as buying illicit   off market BZD on Internet,  flubromazolam and others.  Please see   full discussion in DEC assessment. In Dec assessment he was   seeing bicycles in his room, and appeared to have internal   stimuli. He talked about  Being in a play and made other   nonsensical statements.     MSSA 7 today. Scoring for hand tremor and elevated BP.  As noted   above, he is only partially responsive to me in my interview   today.  He closes his eyes and appears to have REM.  He has   facial twitches and hand tremor.  His right arm is in an awkward   position up by his head.  No rigidity or cogwheeling.  Dry sticky   saliva is seen around his mouth.  Bedside attendant does not   think that he has been up to the bathroom and I see a breakfast   tray with out any food eaten at bedside.    He remained delusional for 10 hours in the emergency room and   there was consideration for admission to psychiatry but due to   the question of withdrawal he was admitted to medicine.  Urine   drug screen is positive for benzodiazepines.  He has not been on   any home psychiatric medications but I see he had a visit at   [facility] and they had noted that he had been prescribed Klonopin 1   mg daily as needed for anxiety.  In the [facility] he denied suicidal   ideation and he denies suicidal ideation today.  He does not   respond to full assessment or orientation questions.    EKG QTC on admission 439            Past Psychiatric History:   PSYCHIATRIC HOSPITALIZATIONS: Unknown.  Review of epic does not   reveal any psychiatric hospitalizations  PSYCHIATRIC TREATMENT/DX: Patient has been seen by a provider at   [facility] and I see that he has been prescribed Klonopin.  Unknown   if he has been followed by psychiatry  CURRENT PSYCHIATRIC PROVIDER: Unknown if currently though he has   seen a psychiatric provider in the past  THERAPIST: Unknown  PSYCHOTROPIC HX/RESPONSE/ADR/ADHERENCE: Discussion in the [facility]   chart indicates that SSRI was discussed with him and he declined   but he has been  recently prescribed Klonopin 1 daily as needed   for anxiety  ONSET OF PSYCHIATRIC SYMPTOMS: Duration of benzodiazepine use 6   months to 1 year, per records  RECENT STRESSORS: Unknown  HX SUICIDE ATTEMPTS/SIB: Unknown but patient currently denies any   suicidal ideation  SLEEP: Unknown  INTEREST/ENERGY: Unable to assess  FOCUS/CONCENTRATION: Currently poor focus and concentration but   unable to assess longitudinal history  ST AND LT MEMORY: Currently poor unable to assess  APPETITE: Not eating here at the hospital  FEEDING ISSUES: Withdrawal issues  MOOD HX/DEPRESSION/MADELYN: Unable to fully assess   ANXIETY/PANIC: History of anxiety  OBSESSION/COMPULSIONS: Unknown  TRAUMA-NIGHTMARES/INTRUSIVE THOUGHTS/FLASHBACKS: Unknown   HALLUCINATIONS: Hallucinations noted in [facility] assessment  DELUSIONS: Unable to assess at this time  PARANOIA: Unable to assess at this time  OTHER sx THOUGHT DISORDER: Unable to assess at this time     TBI/LOC: No known  DELIRIUM SCREEN: Positive likely secondary to acute drug   withdrawal with benzodiazepine or other substances  HISTORY OF COMMITMENT/COURT AUTH MED: None noted in chart          Substance Use and History:   Apparently the patient has been purchasing benzodiazepines off   the Internet and has used kratom.  Notes indicate that he has   tried to cut back.  Unknown alcohol or other drug use.  Unable to   assess at this time.  [facility] assessment indicates that he does use   alcohol.  Records indicate that he quit smoking tobacco but that   he does use e cigarettes        Past Medical History:   PAST MEDICAL HISTORY:   Past Medical History:   Diagnosis Date     Anxiety        PAST SURGICAL HISTORY: History reviewed. No pertinent surgical   history.          Family History:   FAMILY HISTORY:   Family History   Problem Relation Age of Onset     No Known Problems Mother      No Known Problems Father          HX OF SUICIDE IN FAMILY: Unknown   HX OF CD/MI IN FAMILY: Unknown        Social  History:   Patient lives with friends and notes indicate that he has a   sister was involved in his life.  Notes also indicate that he is   worked at [employer].  He is unable to participate in   interview to determine social history         Physical ROS:   The patient is unable to engage in physical review of symptoms at   this time. The remainder of 10-point review of systems was   negative except as noted in HPI.         Medications:     Current Facility-Administered Medications:      acetaminophen (TYLENOL) tablet 650 mg, 650 mg, Oral, Q4H PRN,   Mago Myers MD, 650 mg at 04/24/21 1911     gabapentin (NEURONTIN) tablet 600 mg, 600 mg, Oral, TID,   Maryana Ramos APRN CNP, 600 mg at 04/24/21 1332     lactated ringers infusion, , Intravenous, Continuous, Shanique Tena MD, Last Rate: 125 mL/hr at 04/24/21 1511, New Bag at   04/24/21 1511     lidocaine (LMX4) cream, , Topical, Q1H PRN, Mago Myers MD     lidocaine 1 % 0.1-1 mL, 0.1-1 mL, Other, Q1H PRN, Mago Myers MD     melatonin tablet 1 mg, 1 mg, Oral, At Bedtime PRN, Mago Myers MD, 1 mg at 04/24/21 0137     ondansetron (ZOFRAN-ODT) ODT tab 4 mg, 4 mg, Oral, Q6H PRN   **OR** ondansetron (ZOFRAN) injection 4 mg, 4 mg, Intravenous,   Q6H PRN, Mago Myers MD     sodium chloride (PF) 0.9% PF flush 3 mL, 3 mL, Intracatheter,   Q8H, Mago Myers MD, 3 mL at 04/24/21 1332     sodium chloride (PF) 0.9% PF flush 3 mL, 3 mL, Intracatheter,   q1 min prn, Mago Myers MD  No medications prior to admission.          Allergies:   No Known Allergies       Labs:     Recent Results (from the past 48 hour(s))   EKG 12 lead    Collection Time: 04/23/21  7:04 AM   Result Value Ref Range    Interpretation ECG Click View Image link to view waveform and   result    CBC with platelets differential    Collection Time: 04/23/21  7:30 AM   Result Value Ref Range    WBC 6.6 4.0 - 11.0 10e9/L    RBC Count 4.59 4.4 - 5.9 10e12/L     Hemoglobin 14.8 13.3 - 17.7 g/dL    Hematocrit 42.8 40.0 - 53.0 %    MCV 93 78 - 100 fl    MCH 32.2 26.5 - 33.0 pg    MCHC 34.6 31.5 - 36.5 g/dL    RDW 11.6 10.0 - 15.0 %    Platelet Count 239 150 - 450 10e9/L    Diff Method Automated Method     % Neutrophils 61.0 %    % Lymphocytes 27.1 %    % Monocytes 10.7 %    % Eosinophils 0.3 %    % Basophils 0.6 %    % Immature Granulocytes 0.3 %    Nucleated RBCs 0 0 /100    Absolute Neutrophil 4.0 1.6 - 8.3 10e9/L    Absolute Lymphocytes 1.8 0.8 - 5.3 10e9/L    Absolute Monocytes 0.7 0.0 - 1.3 10e9/L    Absolute Eosinophils 0.0 0.0 - 0.7 10e9/L    Absolute Basophils 0.0 0.0 - 0.2 10e9/L    Abs Immature Granulocytes 0.0 0 - 0.4 10e9/L    Absolute Nucleated RBC 0.0    Comprehensive metabolic panel    Collection Time: 04/23/21  7:30 AM   Result Value Ref Range    Sodium 141 133 - 144 mmol/L    Potassium 3.9 3.4 - 5.3 mmol/L    Chloride 104 94 - 109 mmol/L    Carbon Dioxide 29 20 - 32 mmol/L    Anion Gap 8 3 - 14 mmol/L    Glucose 104 (H) 70 - 99 mg/dL    Urea Nitrogen 17 7 - 30 mg/dL    Creatinine 1.13 0.66 - 1.25 mg/dL    GFR Estimate 89 >60 mL/min/[1.73_m2]    GFR Estimate If Black >90 >60 mL/min/[1.73_m2]    Calcium 9.2 8.5 - 10.1 mg/dL    Bilirubin Total 0.5 0.2 - 1.3 mg/dL    Albumin 4.2 3.4 - 5.0 g/dL    Protein Total 7.5 6.8 - 8.8 g/dL    Alkaline Phosphatase 59 40 - 150 U/L    ALT 18 0 - 70 U/L    AST 12 0 - 45 U/L   Asymptomatic SARS-CoV-2 COVID-19 Virus (Coronavirus) by PCR    Collection Time: 04/23/21  5:58 PM    Specimen: Nasopharyngeal   Result Value Ref Range    SARS-CoV-2 Virus Specimen Source Nasopharyngeal     SARS-CoV-2 PCR Result NEGATIVE     SARS-CoV-2 PCR Comment (Note)    UA reflex to Microscopic and Culture    Collection Time: 04/23/21  6:30 PM    Specimen: Urine Midstream; Midstream Urine   Result Value Ref Range    Color Urine Yellow     Appearance Urine Clear     Glucose Urine Negative NEG^Negative mg/dL    Bilirubin Urine Negative NEG^Negative     Ketones Urine Negative NEG^Negative mg/dL    Specific Gravity Urine 1.018 1.003 - 1.035    Blood Urine Negative NEG^Negative    pH Urine 6.0 5.0 - 7.0 pH    Protein Albumin Urine Negative NEG^Negative mg/dL    Urobilinogen mg/dL Normal 0.0 - 2.0 mg/dL    Nitrite Urine Negative NEG^Negative    Leukocyte Esterase Urine Negative NEG^Negative    Source Midstream Urine    Drug abuse screen 6 urine (chem dep)    Collection Time: 04/23/21  6:30 PM   Result Value Ref Range    Amphetamine Qual Urine Negative NEG^Negative    Barbiturates Qual Urine Negative NEG^Negative    Benzodiazepine Qual Urine Positive (A) NEG^Negative    Cannabinoids Qual Urine Negative NEG^Negative    Cocaine Qual Urine Negative NEG^Negative    Ethanol Qual Urine Negative NEG^Negative    Opiates Qualitative Urine Negative NEG^Negative          Physical and Psychiatric Examination:     BP (!) 163/73 (BP Location: Left arm)   Pulse 101   Temp 97.8    F (36.6  C) (Oral)   Resp 18   Wt 77.9 kg (171 lb 11.2 oz)     SpO2 91%   Weight is 171 lbs 11.2 oz  There is no height or weight on file   to calculate BMI.    Physical Exam:  I have reviewed the physical exam as documented by by the medical   team and agree with findings and assessment and have no   additional findings to add at this time.        Mental Status Exam  APPEARANCE/GROOMING/ATTITUDE: Disheveled, resting in bed, unable   to cooperate in interview  ORIENTATION: Unable to assess  SPEECH: 1-2 word answers then is unresponsive  MOVEMENTS: Facial twitching, with closed eyes has REM.  Bilateral   hand tremor.  No cogwheeling or rigidity noted.  He is holding   his right arm up in an awkward position by his head  THOUGHT PROCESS: Disorganized  THOUGHT CONTENT: Recent hallucinations and delusions noted.    Patient has denied suicidal ideation.  Unable to fully assess  ATTENTION/CONCENTRATION/RECALL: Poor focus and concentration.    Unable to assess recall  MOOD: Noncontributory  AFFECT: Consistent  "with delirium   INTELLECTUAL CAPACITY: Unable to assess  FUND OF KNOWLEDGE: Unable to assess  INSIGHT: Poor  JUDGMENT: Poor  IMPULSE CONTROL: Patient is struggled with ongoing substance use   despite the consequences    RISK ASSESSMENT: Risk for intentional or unintentional self-harm   due to current altered mental status.  Risk the consequences   related to ongoing substance use.  Risk of seizures               DSM-5 Diagnosis:   Benzodiazepine use disorder, severe and persistent with   benzodiazepine withdrawal symptoms  Psychosis NOS          Assessment:   This is a 25-year-old male who was brought into the emergency   room apparently by friends of concern for a psychiatric status;   his friends had described him as \"manic and confused\".    Apparently the patient has been buying benzodiazepines on the   Internet and records indicate that he is also been using kratom.    He told [facility]  that he had been trying to taper off of   these medications.  Urine drug screen is positive for   benzodiazepines.  The patient is twitching and only partially   able to respond to my questions then he closes his eyes and is   observed to have facial twitching and rapid eye movement and when   I call his name he appears to startle in response.  He has had   poor p.o. intake and it appears that he has not been taking   adequate fluids.  Given his benzodiazepine use he is at risk of   seizures.  He does not appear to be medically stable to be able   to transfer to psychiatry.            Summary of Recommendations:   Please see above.              Psychiatry IP Consult: delvin mendoza; Consultant may enter orders: Yes; Patient to be seen: Routine; Call back #: [id number]; Requesting provider? Hospitalist (if different from attending physician)     Status: None ()    Maryana Barnett APRN CNP     4/25/2021  3:34 PM      Psychiatry Consultation; Follow up              Reason for Consult, requesting source:    Follow " up  Requesting source: Mago Myers                 Interim history:    Carmelo Coello is a 25 year old male admitted on 4/23/2021.   He has history of anxiety and depression, and was brought to the   hospital by a friend due to concern for psychosis related to   benzodiazepine use and possible withdrawal.    Charting over the last 24 hour were reviewed, he is more alert   and able to engage in the interview today. Based on MSSA scores   and out of caution for potential for seizure with withdrawal, as   well as some agitation this AM Ativan 1 mg TID was started.   Gabapentin was started yesterday.  The patient tells me that he   is feeling much better today and has been able to get up to BR   and he has been eating.  He has some IV fluids, now stopped and   he is taking PO fluids.    When I talked to him today about his benzodiazepine use he   continues to be quite vague.  However he does say that he was   obtaining several benzodiazepine related substances on the   Internet and had tried to cut back.  He acknowledges craton use   but says that that has been over a year ago.  He had been   prescribed Klonopin but that was also over a year ago.  MSSA   scores have been higher scoring as high as 24, most recently 10.    He has scored for tremor, eating disturbance, sleep disturbance,   hallucinations and alterations in his vital signs.  Blood   pressure has been up to 159/90.  Pulse 103.  He has a bit of a   temperature at 100.1 that we are monitoring closely.  Covid   testing on admission was negative.  Patient is taking p.o.   medications without difficulty.  As noted yesterday his QTC on   EKG was 439.    He denies suicidal ideation and tells me that he will consider   chemical dependency treatment.  When I talk to him about auditory   or visual hallucination he denies this but he gives a rather   strange response to his current status later in the interview,   telling me that he was hooked up to a meter that  measured the gas   in his stomach and that he saw the meter and it was high which   suggests some psychotic symptoms.    Patient tells me today that he graduated from [employer] with a degree in psychology.  His family and friends   are supportive.  His mother and younger sister live in [place].  He moved to the [facility] from [facility] as a young   child.  His parents subsequently  and his father returned   to [facility].  No known MI or CAD in his biological relatives.  He tells me that   he is generally in good health.     Lab Results   Component Value Date    WBC 7.3 04/25/2021     Lab Results   Component Value Date    RBC 4.57 04/25/2021     Lab Results   Component Value Date    HGB 14.8 04/25/2021     Lab Results   Component Value Date    HCT 43.2 04/25/2021     No components found for: MCT  Lab Results   Component Value Date    MCV 95 04/25/2021     Lab Results   Component Value Date    MCH 32.4 04/25/2021     Lab Results   Component Value Date    MCHC 34.3 04/25/2021     Lab Results   Component Value Date    RDW 11.3 04/25/2021     Lab Results   Component Value Date     04/25/2021     Last Comprehensive Metabolic Panel:  Sodium   Date Value Ref Range Status   04/25/2021 142 133 - 144 mmol/L Final     Potassium   Date Value Ref Range Status   04/25/2021 3.6 3.4 - 5.3 mmol/L Final     Chloride   Date Value Ref Range Status   04/25/2021 108 94 - 109 mmol/L Final     Carbon Dioxide   Date Value Ref Range Status   04/25/2021 27 20 - 32 mmol/L Final     Anion Gap   Date Value Ref Range Status   04/25/2021 7 3 - 14 mmol/L Final     Glucose   Date Value Ref Range Status   04/25/2021 106 (H) 70 - 99 mg/dL Final     Urea Nitrogen   Date Value Ref Range Status   04/25/2021 13 7 - 30 mg/dL Final     Creatinine   Date Value Ref Range Status   04/25/2021 0.96 0.66 - 1.25 mg/dL Final     GFR Estimate   Date Value Ref Range Status   04/25/2021 >90 >60 mL/min/[1.73_m2] Final      Comment:     Non  GFR Calc  Starting 12/18/2018, serum creatinine based estimated GFR (eGFR)   will be   calculated using the Chronic Kidney Disease Epidemiology   Collaboration   (CKD-EPI) equation.       Calcium   Date Value Ref Range Status   04/25/2021 8.6 8.5 - 10.1 mg/dL Final     Bilirubin Total   Date Value Ref Range Status   04/25/2021 0.5 0.2 - 1.3 mg/dL Final     Alkaline Phosphatase   Date Value Ref Range Status   04/25/2021 54 40 - 150 U/L Final     ALT   Date Value Ref Range Status   04/25/2021 15 0 - 70 U/L Final     AST   Date Value Ref Range Status   04/25/2021 8 0 - 45 U/L Final            Medications:     Current Facility-Administered Medications:      acetaminophen (TYLENOL) tablet 650 mg, 650 mg, Oral, Q4H PRN,   Mago Myers MD, 650 mg at 04/24/21 1911     gabapentin (NEURONTIN) tablet 600 mg, 600 mg, Oral, TID,   Maryana Ramos APRN CNP, 600 mg at 04/25/21 0822     lactated ringers infusion, , Intravenous, Continuous, Shanique Tena MD, Last Rate: 125 mL/hr at 04/24/21 2337, New Bag at   04/24/21 2337     lidocaine (LMX4) cream, , Topical, Q1H PRN, Mago Myers MD     lidocaine 1 % 0.1-1 mL, 0.1-1 mL, Other, Q1H PRN, Mago Myers MD     LORazepam (ATIVAN) tablet 1 mg, 1 mg, Oral, TID, Maryana Ramos APRN CNP, 1 mg at 04/25/21 1021     melatonin tablet 1 mg, 1 mg, Oral, At Bedtime PRN, Mago Myers MD, 1 mg at 04/24/21 2150     OLANZapine zydis (zyPREXA) ODT tab 5 mg, 5 mg, Oral, TID PRN,   Maryana Ramos APRN CNP     ondansetron (ZOFRAN-ODT) ODT tab 4 mg, 4 mg, Oral, Q6H PRN   **OR** ondansetron (ZOFRAN) injection 4 mg, 4 mg, Intravenous,   Q6H PRN, Mago Myers MD     sodium chloride (PF) 0.9% PF flush 3 mL, 3 mL, Intracatheter,   Q8H, Mago Myers MD, 3 mL at 04/24/21 1332     sodium chloride (PF) 0.9% PF flush 3 mL, 3 mL, Intracatheter,   q1 min prn, Mago Myers MD           MSE:   APPEARANCE/GROOMING/ATTITUDE: Fair grooming though  "somewhat   malodorous.  Calm, cooperative though he had been agitated in the   morning  ORIENTATION: Oriented to person.  Knows he is in the hospital but   is unable to tell me the name of the hospital.  Oriented to date   day and situation.  SPEECH: Normal rate and rhythm, clear  MOVEMENTS: Occasional twitching of face and arms and mild body   jerking of upper extremities noted over the 20-minute interview   but much improved since yesterday.  Only mild facial twitching.    Moderate bilateral hand tremor.  Denies restlessness  THOUGHT PROCESS: Moderate level of disorganization with non   sequiturs  THOUGHT CONTENT: Denies thoughts of self-harm or thoughts of harm   towards others.  Denies auditory or visual hallucinations though   gives an account of a meter being hooked up to his stomach which   indicates psychosis/withdrawal delirium.  No clear evidence of   paranoia  ATTENTION/CONCENTRATION/RECALL: Fair attention and concentration,   recall 3/3  Cognition: Able to name the days of the week forwards and   backwards.  Able to name current and past 2 presidents correctly.    Able to calculate the number of quarters in a dollar 75  MOOD: \"Not that great\"  AFFECT: Flat, preoccupied  INTELLECTUAL CAPACITY: Average  FUND OF KNOWLEDGE: Generally intact  INSIGHT: Fair  JUDGMENT: Fair  IMPULSE CONTROL: Compromised as evidenced by ongoing substance   use and agitation earlier today    RISK ASSESSMENT: Risk of seizure.  Risk of relapse to substance   use with consequences on his physical and mental health        Vital signs:  Temp: 100.1  F (37.8  C) Temp src: Axillary BP: (!) 148/73 Pulse:   103   Resp: 18 SpO2: 95 % O2 Device: None (Room air)     Weight:   77.9 kg (171 lb 11.2 oz)  There is no height or weight on file to calculate BMI.              DSM-5 Diagnosis:   Benzodiazepine use disorder, severe and persistent with   benzodiazepine withdrawal symptoms  Psychosis NOS          Assessment:   This is a 25-year-old " single male who was seen in initial   psychiatry consult yesterday for benzodiazepine use disorder and   benzodiazepine withdrawal symptoms.  He had some agitation this   morning and was given Zyprexa 5 mg.  He was started on Ativan 1   mg p.o. 3 times daily to address withdrawal symptoms and minimize   seizure risk along with gabapentin.  Today the patient tells me   he is feeling more comfortable and he is willing to have a   chemical dependency assessment.  He is a bit ambivalent when I   talk about actual treatment.  He has some unusual ideas about his   stomach being hooked up to some kind of meter to measure gas and   he tells me that he saw that the meter gave a high reading.  He   is vague when I ask follow-up questions about this.  I talked to   him about his agitation this morning and he tells me that he will   try to remain calm and that he is feeling much better at this   time.     Patient requires ongoing medical care at this time to manage   benzodiazepine withdrawal.            Summary of Recommendations:   1.  Continue Ativan 1 mg p.o. 3 times daily for benzodiazepine   withdrawal.-ordered  2.  Continue gabapentin 600 mg p.o. 3 times daily-ordered   yesterday  3.  Zyprexa Zydis 5 mg p.o. 3 times daily as needed   agitation-ordered  4.  I have entered an order for psych consult follow-up for   tomorrow to consider benzodiazepine dosing going forward to   assess if patient is medically stable and ready to transfer to   psychiatry  5.  Continue 72-hour hold and one-to-one staffing for now.    Patient had a period of agitation today and we want to provide   for his safety.  This can be reassessed tomorrow  6.  Chemical dependency assessment tomorrow    I discussed the case with Dr. Tena today  Please call if you have any questions [phone]              Chemical Dependency IP Consult     Status: None ()    Meaghan Nicole LADC     4/26/2021 12:21 PM  4/26/2021    CD consult  "acknowledged and closing at this time.   Pt not appropriate for CD evaluation/interview.   Per psych note from today:  \"On interview today, patient is seen in his room. He quickly   becomes disorganized when responding to questions and has a   difficult time recalling the questions being asked. He becomes   tangential with loose associations. He at times believes he is   here because he applied for a job. He endorses both auditory and   visual hallucinations. He is seen grasping at things in the air   which are not there. He appears to come in and out of reality. He   reports that he had been using benzodiazepines that he bought off   the Internet. He showed our hospitalist what he had been using,   appears to have been clonazepam. He told Maryana that he had been   using a variety of benzos, including one called flubromazolam.     In a brief evaluation of cognition, patient was unable to spell   the word \"world\" forward or backward. He was unable to give the   number of quarters in $1.75. He was unable to perform serial 7s.   Stated the president was Lalito Ocampo, then corrected to James Mares. He was unable to state where we are today. He did not know   it was his birthday, but stated it was April 29th, 2021.\"    CD eval can be reordered when pt is able to have a meaningful   conversation about his substance use and able to identify if he   wants HUGO treatment, not have auditory or visual hallucinations   and clears cognitively.     KAROL Rivas  [email or web address]  [phone]     Psychiatry IP Consult: re-eval BZD w/d, Ativan dosing, trans to psych? on 72 hr hold; Consultant may enter orders: Yes; Patient to be seen: Routine; Call back #: hospitalist; Requesting provider? Hospitalist (if different from attending physician)     Status: None ()    Narrative    Pj Alex CNP     4/26/2021 12:17 PM      Psychiatry Consultation; Follow up          Reason for Consult, requesting source:    Re-corey " "BZD w/d, ativan dosing, transfer to psych, on 72 hour   hold  Requesting source: Mago Myers          Interim history:    Mr. Carmelo Coello is a 26-year-old male who was admitted to   [facility] on 4/23/21 after presenting to the emergency   department by a friend due to concern for psychosis related to   benzodiazepine use and possible withdrawal. Psychiatry is   consulted for management of his benzodiazepine withdrawal.     Patient was evaluated by KATHY Yun, PABLITO over the   weekend. Those notes were reviewed. Thus far, patient managed on   gabapentin and scheduled lorazepam. He continues to experience   withdrawal symptoms. He appears to respond to internal stimuli.   He is disorganized. He has had mild elevations in temperature and   blood pressure. Has was tachycardic yesterday. O2 sats in the   mid- to upper 90s.     On interview today, patient is seen in his room. He quickly   becomes disorganized when responding to questions and has a   difficult time recalling the questions being asked. He becomes   tangential with loose associations. He at times believes he is   here because he applied for a job. He endorses both auditory and   visual hallucinations. He is seen grasping at things in the air   which are not there. He appears to come in and out of reality. He   reports that he had been using benzodiazepines that he bought off   the Internet. He showed our hospitalist what he had been using,   appears to have been clonazepam. He told Maryana that he had been   using a variety of benzos, including one called flubromazolam.     In a brief evaluation of cognition, patient was unable to spell   the word \"world\" forward or backward. He was unable to give the   number of quarters in $1.75. He was unable to perform serial 7s.   Stated the president was Lalito Ocampo, then corrected to James Mares. He was unable to state where we are today. He did not know   it was his birthday, but stated it was " "April 29th, 2021.     He is currently agreeable to remain in the hospital.          Medications:     Current Facility-Administered Medications   Medication     acetaminophen (TYLENOL) tablet 650 mg     lactated ringers infusion     lidocaine (LMX4) cream     lidocaine 1 % 0.1-1 mL     melatonin tablet 1 mg     OLANZapine zydis (zyPREXA) ODT tab 5 mg     ondansetron (ZOFRAN-ODT) ODT tab 4 mg    Or     ondansetron (ZOFRAN) injection 4 mg     PHENobarbital (LUMINAL) tablet 64.8 mg    Followed by     [START ON 4/27/2021] PHENobarbital (LUMINAL) tablet 64.8 mg    Followed by     [START ON 4/28/2021] PHENobarbital (LUMINAL) tablet 64.8 mg    Followed by     [START ON 4/29/2021] PHENobarbital (LUMINAL) tablet 32.4 mg     sodium chloride (PF) 0.9% PF flush 3 mL     sodium chloride (PF) 0.9% PF flush 3 mL              MSE:     Appearance: age-appearing, dressed in orange scrubs, wearing   glasses, unkempt hair  Behavior: disorganized, restless, but cooperative with questions  Orientation: alert and oriented to self. Not oriented to   situation, or place. Off date by 3 days.   Movements: tremulous in bilateral upper extremities, jerking   movements noted at times in extremities.   Mood: \"okay\"  Affect: blunted, mood-congruent, stable  Speech: normal rate and tone, mildly tangential  Memory: no impairment in immediate, recent, or remote memory  Intellectual capacity: Average for development based on word   choice  Concentration: distracted, impaired ability to concentrate  Thought process and content: disorganized, tangential, loosely   associated. Endorses auditory and visual hallucinations. He is   responding to internal stimuli. No paranoia observed.   Insight: impaired  Judgement: impaired  Safety: does not voice any suicidal or homicidal ideation      Vital signs:  Temp: 99.2  F (37.3  C) Temp src: Oral BP: 119/65 Pulse: 68     Resp: 16 SpO2: 95 % O2 Device: None (Room air)     Weight: 77.9   kg (171 lb 11.2 oz)  There is " no height or weight on file to calculate BMI.              DSM-5 Diagnosis:   #1 Benzodiazepine use disorder, severe  #2 Benzodiazepine withdrawal, severe, complicated by auditory and   visual hallucinations  #3 Encephalopathy secondary to benzodiazepine withdrawal  #4 Unspecified depressive disorder, by history  #5 Generalized anxiety disorder, by history          Assessment:   Mr. Carmelo Coello is a 26-year-old male who was admitted for   management of benzodiazepine withdrawal after presenting to the   emergency department with symptoms of psychosis and disorganized   behavior. Patient seen by Maryana Ramos over the weekend,   started on lorazepam 1 mg TID as well as gabapentin 600 mg tid.     Patient continues to experience severe withdrawals, complicated   by auditory and visual hallucinations. It is still unclear   exactly what benzodiazepines he was taking, as he was obtaining   from the Internet, but it sounds as though it could have been   several different compounds. He tells me he had been using for   the past 6 to 8 months. 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 He is   currently disoriented to place and situation. He is actively   hallucinating and responding to internal stimuli. His ability to   concentrate is severely impaired and he often forgets the   question several seconds after being asked. He is seen attempting   to perform several tasks which this writer could not see. His   withdrawal symptoms do have the potential to become more severe   and we will have to monitor closely. For now, would recommend we   start phenobarbital as the scheduled lorazepam and gabapentin do   not appear effective. Will order a phenobarbital taper, beginning   with 64.8 mg QID x 4 doses, then 64.8 TID x 3 doses, then 64.8   BID x 2 doses, then to 32.4 mg x 2 doses, then discontinue. Will  "  continue to monitor for improvements.           Summary of Recommendations:   1) Discontinue lorazepam and gabapentin    2) Start phenobarbital taper - 64.8 mg QID x 4 doses, then 64.8   TID x 3 doses, then 64.8 BID x 2 doses, then to 32.4 mg x 2   doses, then discontinue. First dose will be today at noon, so   some doses will be carried over into the following day.     3) Continue to monitor vital signs and respiratory status   closely. Monitor for seizures.     4) Continue 72 hour hold at this time. Patient does not have the   capacity to safely care for himself at this time due to acute   encephalopathy. Will continue to re-evaluate.      5) Continue 1:1 bedside attendant for safety    6) Page me with additional questions or concerns, thank you.          Psychiatry IP Consult: Follow-up bzd withdrawal; Consultant may enter orders: Yes; Patient to be seen: Routine; Call back #: [phone]; Requesting provider? Hospitalist (if different from attending physician)     Status: None ()    Narrative    Pj Alex CNP     4/27/2021 11:29 AM      Psychiatry Consultation; Follow up          Reason for Consult, requesting source:    Follow-up BZD withdrawal  Requesting source: Mago Myers          Interim history:    Mr. Carmelo Ceollo is a 26-year-old male who was admitted to   [facility] on 4/23/21 after presenting to the emergency   department by a friend due to concern for psychosis related to   benzodiazepine use and possible withdrawal. Psychiatry is   consulted for management of his benzodiazepine withdrawal.     On interview today, patient is seen calmly seated in chair. When   I enter, he immediately stands up, but is redirectable to sit   back down. He is disoriented to situation, place, and date. He is   not able to recall any information from the past day. When asked   if he feels anxious, he reports \"not particularly.\" His tremors   are vastly improved today. His thoughts are rather illogical and " "  loosely associated. He is unable to appropriately answer most   questions. He believes we are in some sort of government   building. His eyes remained closed throughout interview.     Per RN, he was apparently quite disorganized in behavior this   morning. He was in his room, disrobed, appearing somewhat   agitated.          Medications:     Current Facility-Administered Medications   Medication     acetaminophen (TYLENOL) tablet 650 mg     lactated ringers infusion     lidocaine (LMX4) cream     lidocaine 1 % 0.1-1 mL     LORazepam (ATIVAN) tablet 1 mg     melatonin tablet 1 mg     OLANZapine zydis (zyPREXA) ODT tab 5 mg     ondansetron (ZOFRAN-ODT) ODT tab 4 mg    Or     ondansetron (ZOFRAN) injection 4 mg     PHENobarbital (LUMINAL) tablet 64.8 mg    Followed by     [START ON 4/28/2021] PHENobarbital (LUMINAL) tablet 64.8 mg    Followed by     [START ON 4/29/2021] PHENobarbital (LUMINAL) tablet 32.4 mg     sodium chloride (PF) 0.9% PF flush 3 mL     sodium chloride (PF) 0.9% PF flush 3 mL              MSE:     Appearance: age-appearing, seated in bedside chair, eyes closed,   in no apparent distress  Behavior: cooperative and calm, redirectable  Orientation: alert and confused. Oriented to self. Not oriented   to place, date, or situation.   Movements: did not observe any tics, tremors, or dystonia today  Mood: \"fine\"  Affect: flat, mood-congruent  Speech: Normal rate, rhythm, tone  Memory: recent memory is impaired based on limited recall; remote   memory grossly intact  Intellectual capacity: Average for development based on word   choice  Concentration: attentive to interview  Thought process and content: disorganized, illogical; loosely   associated. Denies auditory or visual hallucinations. No paranoia   noted.   Insight: impaired  Judgement: impaired  Safety: denies suicidal or homicidal ideation, plan, or intent      Vital signs:  Temp: 98.5  F (36.9  C) Temp src: Oral BP: 132/81 Pulse: 76     Resp: 16 " SpO2: 94 % O2 Device: None (Room air)     Weight: 77.9   kg (171 lb 11.2 oz)  There is no height or weight on file to calculate BMI.              DSM-5 Diagnosis:   #1 Benzodiazepine use disorder, severe  #2 Benzodiazepine withdrawal, severe, complicated by auditory and   visual hallucinations  #3 Encephalopathy secondary to benzodiazepine withdrawal  #4 Unspecified depressive disorder, by history  #5 Generalized anxiety disorder, by history          Assessment:   Mr. Carmelo Coello is a 26-year-old male who was admitted for   management of benzodiazepine withdrawal after presenting to the   emergency department with symptoms of psychosis and disorganized   behavior.    Yesterday, stopped gabapentin and scheduled lorazepam and started   phenobarbital taper. Patient appears to be tolerating this well.   His tremors have improved quite a bit. He unfortunately remains   delirious and thoughts are not based in reality. He does not   appear to respond to internal stimuli today, as was observed   yesterday. He is disoriented to place, date, and situation. Will   hope his mental status will begin to improve over the next couple   of days. Vital signs appear stable over the past 12-18 hours.     Continue phenobarbital taper: 64.8 mg QID x 4 doses, then 64.8   TID x 3 doses, then 64.8 BID x 2 doses, then to 32.4 mg x 2   doses, then discontinue.          Summary of Recommendations:   1) Continue phenobarbital taper     2) Continue to assess for mental status changes/improvement.   Physically, he appears to be doing better today, but remains   encephalopathic.    3) Continue 72 hour hold for now. He is not currently demanding   to leave and compliant with care so I do not believe we need to   pursue commitment.     4) Continue 1:1 attendant due to confusion    5) CD evaluation when mentation clears    6) Page me with additional questions or concerns, thank you.       KATHY Johns, CNP  [facility]  "[facility]   Contact information available via [facility] Paging/Directory           Psychiatry IP Consult: f/u benzo withdrawal; Consultant may enter orders: Yes; Patient to be seen: Routine; Call back #: [phone]; Requesting provider? Hospitalist (if different from attending physician)     Status: None ()    Lanny    Pj Alex, CNP     4/28/2021 12:22 PM        Psychiatry Consultation; Follow up          Reason for Consult, requesting source:    F/u benzo withdrawal  Requesting source: Mago Myers          Interim history:    Mr. Carmelo Coello is a 26-year-old male who was admitted to   [facility] on 4/23/21 after presenting to the emergency   department by a friend due to concern for psychosis related to   benzodiazepine use and possible withdrawal. Psychiatry is   consulted for management of his benzodiazepine withdrawal.     Mr. Coello is seen in his hospital room this morning. He is   somnolent but awakens easily when I enter his room. He reports   feeling \"okay\" physically, but is feeling a bit down about his   current situation, being hospitalized. He reports he would be   agreeable to outpatient chemical dependency resources. Reports he   was using the benzodiazepines to manage his anxiety. Discussed   our recommendation that he no longer use any benzodiazepines.   Discussed that seeing a therapist may be helpful for alternative   ways to deal with his anxiety. He denies any thoughts of wanting   to harm himself or others. He is oriented to place, date, and   situation.          Medications:     Current Facility-Administered Medications   Medication     acetaminophen (TYLENOL) tablet 650 mg     lactated ringers infusion     lidocaine (LMX4) cream     lidocaine 1 % 0.1-1 mL     LORazepam (ATIVAN) tablet 1 mg     melatonin tablet 1 mg     OLANZapine zydis (zyPREXA) ODT tab 5 mg     ondansetron (ZOFRAN-ODT) ODT tab 4 mg    Or     ondansetron (ZOFRAN) injection 4 mg     PHENobarbital " "(LUMINAL) tablet 32.4 mg     sodium chloride (PF) 0.9% PF flush 3 mL     sodium chloride (PF) 0.9% PF flush 3 mL              MSE:     Appearance: age-appearing, somnolent, mildly unkempt,   cooperative, in no acute distress  Behavior: cooperative and calm  Orientation: alert and oriented to time, place and person  Movements: no tics, tremors or dystonia  Mood: \"not great\"  Affect: mood-congruent, mildly dysphoric, stable  Speech: Normal rate, rhythm, tone  Memory: recent memory appears impaired; remote memory grossly   intact based on recall  Intellectual capacity: Average for development based on word   choice  Concentration: attentive to interview  Thought process and content: linear, coherent. Denies AH/VH. Does   not appear to respond to internal stimuli at this time. No   delusions or paranoia noted.   Insight: fair  Judgement: fair  Safety: denies suicidal or homicidal ideation, plan, or intent    Vital signs:  Temp: 97.4  F (36.3  C) Temp src: Oral BP: 123/62 Pulse: 65     Resp: 16 SpO2: 97 % O2 Device: None (Room air)     Weight: 77.9   kg (171 lb 11.2 oz)  There is no height or weight on file to calculate BMI.            DSM-5 Diagnosis:   #1 Benzodiazepine use disorder, severe  #2 Benzodiazepine withdrawal, severe, complicated by auditory and   visual hallucinations  #3 Encephalopathy secondary to benzodiazepine withdrawal,   improving  #4 Unspecified depressive disorder, by history  #5 Generalized anxiety disorder, by history          Assessment:   Mr. Carmelo Coello is a 26-year-old male who was admitted for   management of benzodiazepine withdrawal after presenting to the   emergency department with symptoms of psychosis and disorganized   behavior.    Appears to be tolerating the phenobarbital taper well. He is a   bit somnolent today. He is no longer responding to internal   stimuli and is now fully oriented. The reality of his situation   is beginning to set in and he appears a bit dysphoric. Will "   reassess mood tomorrow to evaluate whether he would benefit from   an SSRI. He is not currently interested in inpatient CD treatment   but is open to outpatient resources.     Will discontinue 72 hour hold - pt compliant with medical care   and not asking to leave. Will discontinue bedside attendant and   suicide precautions.            Summary of Recommendations:   1) Discontinue 72 hour hold. Discontinue 1:1 bedside attendant.     2) SW consult for outpatient CD resources    3) Patient may benefit from an SSRI for mood and anxiety. Will   re-evaluate tomorrow morning.     4) Will decrease phenobarbital to 32.4 mg this evening, give 2   more doses tomorrow, then discontinue. He will likely be ready   for discharge in 1-2 days.      5) Page me with additional questions or concerns       KATHY Johns, [facility]   Contact information available via [facility] Paging/Directory                No results found for this or any previous visit (from the past 48 hour(s)).             Pending Results:   Unresulted Labs Ordered in the Past 30 Days of this Admission     No orders found from 3/24/2021 to 4/24/2021.                  Discharge Instructions and Follow-Up:     Discharge Procedure Orders   Reason for your hospital stay   Order Comments: Admitted for benzodiazepine withdrawal. Now better. Hallucinations have resolved. See detailed discharge summary     Adult [facility] Follow-up and recommended labs and tests   Order Comments: Follow up with primary care provider, Physician No Ref-Primary, within 7 days for hospital follow- up.  No follow up labs or test are needed.      Appointments on [place] and/or [place] campus (with [facility] or [facility] provider or service). Call [phone] if you haven't heard regarding these appointments within 7 days of discharge.     Activity   Order Comments: Your activity upon discharge: activity as tolerated     Order Specific Question  Answer Comments   Is discharge order? Yes      Diet   Order Comments: Follow this diet upon discharge: Orders Placed This Encounter      Regular Diet Adult     Order Specific Question Answer Comments   Is discharge order? Yes             Bran Gillette MD  Internal Medicine Staff Hospitalist  [phone]  April 29, 2021        Time spent on patient: 35 minutes total including face to face and coordinating care time reviewing current illness, any medication changes, and the care plan for today.

## 2021-04-30 NOTE — PROGRESS NOTES
Attempted to contact the patient x2 for post-hospital call without answer. Will close encounter at this time.    Shaunna Villeda CMA  Post Hospital Discharge Team

## 2022-04-19 NOTE — PLAN OF CARE
VS: VSS   O2: Cont pulse ox in place.  Sats > 90% on RA   Output: Voids  independently in BR   Last BM: 4/24 and passing gas   Activity: Independent    Skin: Dry and intact   Pain: Denies   CMS: No new numbness and tingling   Dressing: None   Diet: Regular   LDA: PIV- R ac infusing with LR   Equipment: IV pole and personal belongings    Plan: TBD   Additional Info: Alert and oriented to self.Very sensitive with noise.  Intermittent jerking of bilateral upper and lower extremities observed.   Twitching of the eyes were observed at the beginning of the shift.  Seizure pad ON.   Sitter at bedside for safety       Transplant Surgery Normal vision: sees adequately in most situations; can see medication labels, newsprint